# Patient Record
Sex: FEMALE | Race: WHITE | Employment: OTHER | URBAN - METROPOLITAN AREA
[De-identification: names, ages, dates, MRNs, and addresses within clinical notes are randomized per-mention and may not be internally consistent; named-entity substitution may affect disease eponyms.]

---

## 2017-02-18 PROBLEM — S43.006A SHOULDER DISLOCATION: Status: ACTIVE | Noted: 2017-02-18

## 2022-11-14 ENCOUNTER — APPOINTMENT (OUTPATIENT)
Dept: GENERAL RADIOLOGY | Age: 87
DRG: 690 | End: 2022-11-14
Payer: MEDICARE

## 2022-11-14 ENCOUNTER — APPOINTMENT (OUTPATIENT)
Dept: CT IMAGING | Age: 87
DRG: 690 | End: 2022-11-14
Payer: MEDICARE

## 2022-11-14 ENCOUNTER — HOSPITAL ENCOUNTER (INPATIENT)
Age: 87
LOS: 5 days | Discharge: HOME | DRG: 690 | End: 2022-11-21
Attending: EMERGENCY MEDICINE | Admitting: INTERNAL MEDICINE
Payer: MEDICARE

## 2022-11-14 DIAGNOSIS — N30.00 ACUTE CYSTITIS WITHOUT HEMATURIA: ICD-10-CM

## 2022-11-14 DIAGNOSIS — R41.82 ALTERED MENTAL STATUS, UNSPECIFIED ALTERED MENTAL STATUS TYPE: Primary | ICD-10-CM

## 2022-11-14 DIAGNOSIS — R41.0 ACUTE DELIRIUM: ICD-10-CM

## 2022-11-14 LAB
ALBUMIN SERPL-MCNC: 4.5 G/DL (ref 3.5–5.2)
ALP BLD-CCNC: 86 U/L (ref 35–104)
ALT SERPL-CCNC: 12 U/L (ref 0–32)
AMMONIA: <10 UMOL/L (ref 11–51)
ANION GAP SERPL CALCULATED.3IONS-SCNC: 16 MMOL/L (ref 7–16)
AST SERPL-CCNC: 34 U/L (ref 0–31)
BACTERIA: ABNORMAL /HPF
BASOPHILS ABSOLUTE: 0.04 E9/L (ref 0–0.2)
BASOPHILS RELATIVE PERCENT: 0.4 % (ref 0–2)
BILIRUB SERPL-MCNC: 0.4 MG/DL (ref 0–1.2)
BILIRUBIN URINE: NEGATIVE
BLOOD, URINE: ABNORMAL
BUN BLDV-MCNC: 14 MG/DL (ref 6–23)
CALCIUM SERPL-MCNC: 10.7 MG/DL (ref 8.6–10.2)
CHLORIDE BLD-SCNC: 98 MMOL/L (ref 98–107)
CLARITY: CLEAR
CO2: 25 MMOL/L (ref 22–29)
COLOR: YELLOW
CREAT SERPL-MCNC: 1 MG/DL (ref 0.5–1)
EOSINOPHILS ABSOLUTE: 0 E9/L (ref 0.05–0.5)
EOSINOPHILS RELATIVE PERCENT: 0 % (ref 0–6)
EPITHELIAL CELLS, UA: ABNORMAL /HPF
GFR SERPL CREATININE-BSD FRML MDRD: 52 ML/MIN/1.73
GLUCOSE BLD-MCNC: 119 MG/DL (ref 74–99)
GLUCOSE URINE: NEGATIVE MG/DL
HCT VFR BLD CALC: 45.8 % (ref 34–48)
HEMOGLOBIN: 14.8 G/DL (ref 11.5–15.5)
IMMATURE GRANULOCYTES #: 0.05 E9/L
IMMATURE GRANULOCYTES %: 0.5 % (ref 0–5)
KETONES, URINE: NEGATIVE MG/DL
LACTIC ACID, SEPSIS: 2 MMOL/L (ref 0.5–1.9)
LEUKOCYTE ESTERASE, URINE: ABNORMAL
LYMPHOCYTES ABSOLUTE: 0.66 E9/L (ref 1.5–4)
LYMPHOCYTES RELATIVE PERCENT: 7 % (ref 20–42)
MCH RBC QN AUTO: 29.7 PG (ref 26–35)
MCHC RBC AUTO-ENTMCNC: 32.3 % (ref 32–34.5)
MCV RBC AUTO: 91.8 FL (ref 80–99.9)
MONOCYTES ABSOLUTE: 0.7 E9/L (ref 0.1–0.95)
MONOCYTES RELATIVE PERCENT: 7.5 % (ref 2–12)
NEUTROPHILS ABSOLUTE: 7.92 E9/L (ref 1.8–7.3)
NEUTROPHILS RELATIVE PERCENT: 84.6 % (ref 43–80)
NITRITE, URINE: POSITIVE
PDW BLD-RTO: 14.1 FL (ref 11.5–15)
PH UA: 7 (ref 5–9)
PLATELET # BLD: 291 E9/L (ref 130–450)
PMV BLD AUTO: 10.2 FL (ref 7–12)
POTASSIUM REFLEX MAGNESIUM: 4.2 MMOL/L (ref 3.5–5)
PROTEIN UA: NEGATIVE MG/DL
RBC # BLD: 4.99 E12/L (ref 3.5–5.5)
RBC UA: ABNORMAL /HPF (ref 0–2)
SODIUM BLD-SCNC: 139 MMOL/L (ref 132–146)
SPECIFIC GRAVITY UA: 1.01 (ref 1–1.03)
TOTAL PROTEIN: 8.8 G/DL (ref 6.4–8.3)
UROBILINOGEN, URINE: 0.2 E.U./DL
WBC # BLD: 9.4 E9/L (ref 4.5–11.5)
WBC UA: >20 /HPF (ref 0–5)

## 2022-11-14 PROCEDURE — 71045 X-RAY EXAM CHEST 1 VIEW: CPT

## 2022-11-14 PROCEDURE — 99285 EMERGENCY DEPT VISIT HI MDM: CPT

## 2022-11-14 PROCEDURE — 96375 TX/PRO/DX INJ NEW DRUG ADDON: CPT

## 2022-11-14 PROCEDURE — 87077 CULTURE AEROBIC IDENTIFY: CPT

## 2022-11-14 PROCEDURE — 81001 URINALYSIS AUTO W/SCOPE: CPT

## 2022-11-14 PROCEDURE — 85025 COMPLETE CBC W/AUTO DIFF WBC: CPT

## 2022-11-14 PROCEDURE — 87186 SC STD MICRODIL/AGAR DIL: CPT

## 2022-11-14 PROCEDURE — 83605 ASSAY OF LACTIC ACID: CPT

## 2022-11-14 PROCEDURE — 2580000003 HC RX 258: Performed by: EMERGENCY MEDICINE

## 2022-11-14 PROCEDURE — 87088 URINE BACTERIA CULTURE: CPT

## 2022-11-14 PROCEDURE — 80053 COMPREHEN METABOLIC PANEL: CPT

## 2022-11-14 PROCEDURE — 82140 ASSAY OF AMMONIA: CPT

## 2022-11-14 PROCEDURE — 6360000002 HC RX W HCPCS: Performed by: EMERGENCY MEDICINE

## 2022-11-14 PROCEDURE — 70450 CT HEAD/BRAIN W/O DYE: CPT

## 2022-11-14 PROCEDURE — 93005 ELECTROCARDIOGRAM TRACING: CPT | Performed by: EMERGENCY MEDICINE

## 2022-11-14 PROCEDURE — G0378 HOSPITAL OBSERVATION PER HR: HCPCS

## 2022-11-14 PROCEDURE — 96361 HYDRATE IV INFUSION ADD-ON: CPT

## 2022-11-14 RX ORDER — 0.9 % SODIUM CHLORIDE 0.9 %
500 INTRAVENOUS SOLUTION INTRAVENOUS ONCE
Status: COMPLETED | OUTPATIENT
Start: 2022-11-14 | End: 2022-11-14

## 2022-11-14 RX ADMIN — CEFTRIAXONE 1000 MG: 1 INJECTION, POWDER, FOR SOLUTION INTRAMUSCULAR; INTRAVENOUS at 17:33

## 2022-11-14 RX ADMIN — SODIUM CHLORIDE 500 ML: 9 INJECTION, SOLUTION INTRAVENOUS at 17:32

## 2022-11-14 ASSESSMENT — PAIN - FUNCTIONAL ASSESSMENT
PAIN_FUNCTIONAL_ASSESSMENT: NONE - DENIES PAIN
PAIN_FUNCTIONAL_ASSESSMENT: NONE - DENIES PAIN

## 2022-11-14 NOTE — ED NOTES
Family called 46, states pt lives alone with hx of dementia and refusing to care for self, pt was pink slipped pta, aggressive with ems, ems state pt does not take any home meds     Rock Stahl RN  11/14/22 1738

## 2022-11-14 NOTE — ED PROVIDER NOTES
Department of Emergency Medicine   ED  Provider Note  Admit Date/RoomTime: 11/14/2022  1:10 PM  ED Room: [facility]          History of Present Illness:  11/14/22, Time: 1:30 PM EST  Chief Complaint   Patient presents with    Altered Mental Status     Per family patient has been combative and confused. States she has had hx of UTI                  Lynnette Pan is a 80 y.o. female presenting to the ED for altered mental status, beginning prior to arrival.  The complaint has been constant, severe in severity, and worsened by nothing. Patient presents for evaluation of altered mental status. The patient's daughter is here and says for the last few days her mother has been mean and combative and angry. She reports this is new. She has never acted like this before. They are worried she may have a urinary tract infection. No fevers or chills. No chest pain or shortness of breath. Patient denies complaints but says she is upset that she is here. She is confused. Family normally says she is oriented and not like this. ENCOUNTER LIMITATION:    Please note that the HPI, ROS, Past History, and Physical Examination are limited due to this patients mental status, confusion, dementia, and acuity of illness. Review of Systems:   A complete review of systems was unable to be performed secondary to the limitations noted above      --------------------------------------------- PAST HISTORY ---------------------------------------------  Past Medical History:  has a past medical history of Chronic back pain, Hyperlipidemia, Hypertension, Osteoarthritis, and Osteoporosis. Past Surgical History:  has a past surgical history that includes Cataract removal with implant (Bilateral); Endoscopy, colon, diagnostic; and Endoscopy, colon, diagnostic (09/22/2016). Social History:  reports that she has quit smoking.  She has never used smokeless tobacco. She reports that she does not drink alcohol and does not use drugs. Family History: family history is not on file. . Unless otherwise noted, family history is non contributory    The patients home medications have been reviewed. Allergies: Versed [midazolam]    I have reviewed the past medical history, past surgical history, social history, and family history    ---------------------------------------------------PHYSICAL EXAM--------------------------------------    Constitutional/General: Alert and oriented x1  Head: Normocephalic and atraumatic  Eyes: PERRL, EOMI, sclera non icteric  ENT: Oropharynx clear, handling secretions, no trismus, no asymmetry of the posterior oropharynx or uvular edema  Neck: Supple, full ROM, no stridor, no meningeal signs  Respiratory: Lungs clear to auscultation bilaterally, no wheezes, rales, or rhonchi. Not in respiratory distress  Cardiovascular:  Regular rate. Regular rhythm. No murmurs, no gallops, no rubs. 2+ distal pulses. Equal extremity pulses. Gastrointestinal:  Abdomen Soft, Non tender, Non distended. No rebound, guarding, or rigidity. No pulsatile masses. Musculoskeletal: Moves all extremities x 4. Warm and well perfused, no clubbing, no cyanosis, no edema. Capillary refill <3 seconds  Skin: skin warm and dry. No rashes. Neurologic: GCS 14, confused, no focal deficits, symmetric strength 5/5 in the upper and lower extremities bilaterally, speech clear, no unilateral weakness  Psychiatric: Normal Affect          -------------------------------------------------- RESULTS -------------------------------------------------  Results are listed below.      LABS: (Lab results interpreted by me)  Results for orders placed or performed during the hospital encounter of 11/14/22   CBC with Auto Differential   Result Value Ref Range    WBC 9.4 4.5 - 11.5 E9/L    RBC 4.99 3.50 - 5.50 E12/L    Hemoglobin 14.8 11.5 - 15.5 g/dL    Hematocrit 45.8 34.0 - 48.0 %    MCV 91.8 80.0 - 99.9 fL    MCH 29.7 26.0 - 35.0 pg    MCHC 32.3 32.0 - 34.5 % RDW 14.1 11.5 - 15.0 fL    Platelets 240 936 - 454 E9/L    MPV 10.2 7.0 - 12.0 fL    Neutrophils % 84.6 (H) 43.0 - 80.0 %    Immature Granulocytes % 0.5 0.0 - 5.0 %    Lymphocytes % 7.0 (L) 20.0 - 42.0 %    Monocytes % 7.5 2.0 - 12.0 %    Eosinophils % 0.0 0.0 - 6.0 %    Basophils % 0.4 0.0 - 2.0 %    Neutrophils Absolute 7.92 (H) 1.80 - 7.30 E9/L    Immature Granulocytes # 0.05 E9/L    Lymphocytes Absolute 0.66 (L) 1.50 - 4.00 E9/L    Monocytes Absolute 0.70 0.10 - 0.95 E9/L    Eosinophils Absolute 0.00 (L) 0.05 - 0.50 E9/L    Basophils Absolute 0.04 0.00 - 0.20 E9/L   Comprehensive Metabolic Panel w/ Reflex to MG   Result Value Ref Range    Sodium 139 132 - 146 mmol/L    Potassium reflex Magnesium 4.2 3.5 - 5.0 mmol/L    Chloride 98 98 - 107 mmol/L    CO2 25 22 - 29 mmol/L    Anion Gap 16 7 - 16 mmol/L    Glucose 119 (H) 74 - 99 mg/dL    BUN 14 6 - 23 mg/dL    Creatinine 1.0 0.5 - 1.0 mg/dL    Est, Glom Filt Rate 52 >=60 mL/min/1.73    Calcium 10.7 (H) 8.6 - 10.2 mg/dL    Total Protein 8.8 (H) 6.4 - 8.3 g/dL    Albumin 4.5 3.5 - 5.2 g/dL    Total Bilirubin 0.4 0.0 - 1.2 mg/dL    Alkaline Phosphatase 86 35 - 104 U/L    ALT 12 0 - 32 U/L    AST 34 (H) 0 - 31 U/L   Ammonia   Result Value Ref Range    Ammonia <10.0 (L) 11.0 - 51.0 umol/L   Lactate, Sepsis   Result Value Ref Range    Lactic Acid, Sepsis 2.0 (H) 0.5 - 1.9 mmol/L   Urinalysis   Result Value Ref Range    Color, UA Yellow Straw/Yellow    Clarity, UA Clear Clear    Glucose, Ur Negative Negative mg/dL    Bilirubin Urine Negative Negative    Ketones, Urine Negative Negative mg/dL    Specific Gravity, UA 1.010 1.005 - 1.030    Blood, Urine TRACE-INTACT Negative    pH, UA 7.0 5.0 - 9.0    Protein, UA Negative Negative mg/dL    Urobilinogen, Urine 0.2 <2.0 E.U./dL    Nitrite, Urine POSITIVE (A) Negative    Leukocyte Esterase, Urine MODERATE (A) Negative   Microscopic Urinalysis   Result Value Ref Range    WBC, UA >20 (A) 0 - 5 /HPF    RBC, UA 2-5 0 - 2 /HPF Epithelial Cells, UA MODERATE /HPF    Bacteria, UA MANY (A) None Seen /HPF   EKG 12 Lead   Result Value Ref Range    Ventricular Rate 90 BPM    Atrial Rate 90 BPM    P-R Interval 172 ms    QRS Duration 86 ms    Q-T Interval 380 ms    QTc Calculation (Bazett) 464 ms    P Axis 21 degrees    R Axis -4 degrees   ,       RADIOLOGY:    Radiologist interpretation  CT HEAD WO CONTRAST   Final Result   1. There is no acute intracranial abnormality. Specifically, there is no   intracranial hemorrhage. 2. Atrophy and periventricular leukomalacia,         XR CHEST PORTABLE   Final Result   Questionable left upper lobe infiltrate versus superimposed soft tissues. If   clinically warranted CT of the chest can be obtained for further evaluation. EKG Interpretation  Interpreted by emergency department physician, Dakota Villatoro MD    Date of EK22  Time: 1627    Rhythm: normal sinus   Rate: normal  Axis: left  Conduction: normal  ST Segments: no acute change  T Waves: no acute change    Clinical Impression: sinus rhythm, no acute ischemic changes  Comparison to prior EKG: no previous EKG      ------------------------- NURSING NOTES AND VITALS REVIEWED ---------------------------   The nursing notes within the ED encounter and vital signs as below have been reviewed by myself  BP (!) 163/99   Pulse 72   Temp 97.6 °F (36.4 °C) (Oral)   Resp 16   SpO2 98%     Oxygen Saturation Interpretation: Normal    The patients available past medical records and past encounters were reviewed.         ------------------------------ ED [facility]----------------------         Dr. Wanda WESTON, am the primary provider of record        Medical Decision Making:   AMS, no focal weakness to suggest stroke  ?acute delerium  No neck pain or nuchal rigidity or headache or fever to suggest CNS infection  Antibiotics for UTI  [place] to admit      Name and Route of medications administered in the ED:  Medications cefTRIAXone (ROCEPHIN) 1,000 mg in sterile water 10 mL IV syringe (1,000 mg IntraVENous Given 11/14/22 5420)   0.9 % sodium chloride bolus (0 mLs IntraVENous Stopped 11/14/22 0460)                Consultations:  Dr. Mery Suresh  Counseling: The emergency provider has spoken with the patient and daughter and discussed todays results, in addition to providing specific details for the plan of care and counseling regarding the diagnosis and prognosis. Questions are answered at this time and they are agreeable with the plan.       --------------------------------- IMPRESSION AND DISPOSITION ---------------------------------    IMPRESSION  1. Altered mental status, unspecified altered mental status type    2. Acute delirium    3. Acute cystitis without hematuria        DISPOSITION  Disposition: Admit to telemetry  Patient condition is stable        NOTE: This report was transcribed using voice recognition software.  Every effort was made to ensure accuracy; however, inadvertent computerized transcription errors may be present        Mynor Lopez MD  11/14/22 9796

## 2022-11-15 ENCOUNTER — APPOINTMENT (OUTPATIENT)
Dept: CT IMAGING | Age: 87
DRG: 690 | End: 2022-11-15
Payer: MEDICARE

## 2022-11-15 PROBLEM — R41.82 ALTERED MENTAL STATUS: Status: ACTIVE | Noted: 2022-11-15

## 2022-11-15 LAB
EKG ATRIAL RATE: 90 BPM
EKG P AXIS: 21 DEGREES
EKG P-R INTERVAL: 172 MS
EKG Q-T INTERVAL: 380 MS
EKG QRS DURATION: 86 MS
EKG QTC CALCULATION (BAZETT): 464 MS
EKG R AXIS: -4 DEGREES
EKG VENTRICULAR RATE: 90 BPM

## 2022-11-15 PROCEDURE — 2580000003 HC RX 258: Performed by: INTERNAL MEDICINE

## 2022-11-15 PROCEDURE — G0378 HOSPITAL OBSERVATION PER HR: HCPCS

## 2022-11-15 PROCEDURE — 6370000000 HC RX 637 (ALT 250 FOR IP): Performed by: INTERNAL MEDICINE

## 2022-11-15 PROCEDURE — 96365 THER/PROPH/DIAG IV INF INIT: CPT

## 2022-11-15 PROCEDURE — 96376 TX/PRO/DX INJ SAME DRUG ADON: CPT

## 2022-11-15 PROCEDURE — 2580000003 HC RX 258: Performed by: PSYCHIATRY & NEUROLOGY

## 2022-11-15 PROCEDURE — 71250 CT THORAX DX C-: CPT

## 2022-11-15 PROCEDURE — 6360000002 HC RX W HCPCS: Performed by: INTERNAL MEDICINE

## 2022-11-15 PROCEDURE — 2500000003 HC RX 250 WO HCPCS: Performed by: PSYCHIATRY & NEUROLOGY

## 2022-11-15 RX ORDER — ENOXAPARIN SODIUM 100 MG/ML
30 INJECTION SUBCUTANEOUS DAILY
Status: DISCONTINUED | OUTPATIENT
Start: 2022-11-15 | End: 2022-11-21 | Stop reason: HOSPADM

## 2022-11-15 RX ORDER — ACETAMINOPHEN 325 MG/1
650 TABLET ORAL EVERY 6 HOURS PRN
Status: DISCONTINUED | OUTPATIENT
Start: 2022-11-15 | End: 2022-11-21 | Stop reason: HOSPADM

## 2022-11-15 RX ORDER — SODIUM CHLORIDE 0.9 % (FLUSH) 0.9 %
5-40 SYRINGE (ML) INJECTION EVERY 12 HOURS SCHEDULED
Status: DISCONTINUED | OUTPATIENT
Start: 2022-11-15 | End: 2022-11-21 | Stop reason: HOSPADM

## 2022-11-15 RX ORDER — MELATONIN 200 MCG
3 TABLET ORAL DAILY
Status: DISCONTINUED | OUTPATIENT
Start: 2022-11-15 | End: 2022-11-15

## 2022-11-15 RX ORDER — GABAPENTIN 250 MG/5ML
50 SOLUTION ORAL EVERY 12 HOURS SCHEDULED
Status: DISCONTINUED | OUTPATIENT
Start: 2022-11-15 | End: 2022-11-15

## 2022-11-15 RX ORDER — SODIUM CHLORIDE 9 MG/ML
INJECTION, SOLUTION INTRAVENOUS PRN
Status: DISCONTINUED | OUTPATIENT
Start: 2022-11-15 | End: 2022-11-21 | Stop reason: HOSPADM

## 2022-11-15 RX ORDER — SODIUM CHLORIDE 0.9 % (FLUSH) 0.9 %
5-40 SYRINGE (ML) INJECTION PRN
Status: DISCONTINUED | OUTPATIENT
Start: 2022-11-15 | End: 2022-11-21 | Stop reason: HOSPADM

## 2022-11-15 RX ORDER — POLYETHYLENE GLYCOL 3350 17 G/17G
17 POWDER, FOR SOLUTION ORAL DAILY PRN
Status: DISCONTINUED | OUTPATIENT
Start: 2022-11-15 | End: 2022-11-21 | Stop reason: HOSPADM

## 2022-11-15 RX ORDER — HALOPERIDOL 5 MG/ML
2 INJECTION INTRAMUSCULAR EVERY 6 HOURS PRN
Status: DISCONTINUED | OUTPATIENT
Start: 2022-11-15 | End: 2022-11-16

## 2022-11-15 RX ORDER — ASPIRIN 81 MG/1
81 TABLET ORAL DAILY
COMMUNITY

## 2022-11-15 RX ORDER — ENOXAPARIN SODIUM 100 MG/ML
40 INJECTION SUBCUTANEOUS DAILY
Status: DISCONTINUED | OUTPATIENT
Start: 2022-11-15 | End: 2022-11-15 | Stop reason: DRUGHIGH

## 2022-11-15 RX ORDER — ONDANSETRON 2 MG/ML
4 INJECTION INTRAMUSCULAR; INTRAVENOUS EVERY 6 HOURS PRN
Status: DISCONTINUED | OUTPATIENT
Start: 2022-11-15 | End: 2022-11-21 | Stop reason: HOSPADM

## 2022-11-15 RX ORDER — PANTOPRAZOLE SODIUM 40 MG/1
40 TABLET, DELAYED RELEASE ORAL
Status: DISCONTINUED | OUTPATIENT
Start: 2022-11-15 | End: 2022-11-21 | Stop reason: HOSPADM

## 2022-11-15 RX ORDER — DIVALPROEX SODIUM 125 MG/1
125 CAPSULE, COATED PELLETS ORAL EVERY 8 HOURS
Status: DISCONTINUED | OUTPATIENT
Start: 2022-11-15 | End: 2022-11-21 | Stop reason: HOSPADM

## 2022-11-15 RX ORDER — ACETAMINOPHEN 650 MG/1
650 SUPPOSITORY RECTAL EVERY 6 HOURS PRN
Status: DISCONTINUED | OUTPATIENT
Start: 2022-11-15 | End: 2022-11-21 | Stop reason: HOSPADM

## 2022-11-15 RX ORDER — LANOLIN ALCOHOL/MO/W.PET/CERES
3 CREAM (GRAM) TOPICAL NIGHTLY
Status: DISCONTINUED | OUTPATIENT
Start: 2022-11-15 | End: 2022-11-21 | Stop reason: HOSPADM

## 2022-11-15 RX ORDER — UBIDECARENONE 100 MG
100 CAPSULE ORAL DAILY
Status: ON HOLD | COMMUNITY
End: 2022-11-21 | Stop reason: HOSPADM

## 2022-11-15 RX ORDER — ONDANSETRON 4 MG/1
4 TABLET, ORALLY DISINTEGRATING ORAL EVERY 8 HOURS PRN
Status: DISCONTINUED | OUTPATIENT
Start: 2022-11-15 | End: 2022-11-21 | Stop reason: HOSPADM

## 2022-11-15 RX ADMIN — Medication 10 ML: at 20:54

## 2022-11-15 RX ADMIN — ACETAMINOPHEN 650 MG: 325 TABLET, FILM COATED ORAL at 20:54

## 2022-11-15 RX ADMIN — SODIUM CHLORIDE, PRESERVATIVE FREE 10 ML: 5 INJECTION INTRAVENOUS at 17:10

## 2022-11-15 RX ADMIN — SODIUM CHLORIDE, PRESERVATIVE FREE 10 ML: 5 INJECTION INTRAVENOUS at 17:48

## 2022-11-15 RX ADMIN — CEFTRIAXONE 1000 MG: 1 INJECTION, POWDER, FOR SOLUTION INTRAMUSCULAR; INTRAVENOUS at 17:11

## 2022-11-15 RX ADMIN — DEXTROSE MONOHYDRATE 125 MG: 50 INJECTION, SOLUTION INTRAVENOUS at 17:46

## 2022-11-15 RX ADMIN — Medication 10 ML: at 09:15

## 2022-11-15 ASSESSMENT — LIFESTYLE VARIABLES
HOW MANY STANDARD DRINKS CONTAINING ALCOHOL DO YOU HAVE ON A TYPICAL DAY: PATIENT DOES NOT DRINK
HOW OFTEN DO YOU HAVE A DRINK CONTAINING ALCOHOL: NEVER

## 2022-11-15 ASSESSMENT — PAIN - FUNCTIONAL ASSESSMENT: PAIN_FUNCTIONAL_ASSESSMENT: NONE - DENIES PAIN

## 2022-11-15 NOTE — PROGRESS NOTES
Called and left message for Dr. Marilyn Armijo regarding if pt can be downgraded to medsurg. Awaiting yajaira back.

## 2022-11-15 NOTE — CARE COORDINATION
Return call received from the patient's daughter Wisam Garcia to discuss transition of care. Patient lives with her other daughter Ramiro and granddaughter Shilpa Lester in a two story home with bed and bath on the second floor, with 15 steps to the second floor and bilateral hand rails. Patient has a BSC, Emani Friends, hand rails in the shower, and a bath lift chair. Per patient's daughter the patient was independent with most ADL's prior to admission. Family cooks and cleans for the patient. Patient has a history of home care through [facility] and a remote history of outpatient therapy. Patient's PCP is Dr Maria Isabel Watson and they use [facility] on [address] [facility] for her medications. Per Wisam Garcia, the plan is to take the patient home, possibly with home health care, she would like to see how the patient does with therapy prior to making a decision. She would like a list of private duty providers left at the bedside as the patient may need additional care for when the family is working. Also discussed a referral to [facility] for possible in home assistance for the patient. The patient's daughter would like this as well. Referral form completed and faxed to [facility] at [phone]. PT/OT to see the patient for transition of care planning. Nam Frazier RN.  [place]:  [phone]      Return call received from the patient's daughter Wisam Garcia re: transition of care. He had additional questions re: DME for a Wheelchair, a bedalarm, and transition to Assisted Living. Discussed getting a wheelchair and explained that we will obtain an order for JIGNA Brown for transition of care. Reviewed [facility] and explained that these facilities are private pay and that the patient's insurance will not cover them for transition of care. Wisam Garcia expressed understanding. She stated she would still like a list to review and to call around for pricing for future planning.   After some research by CHRISTINE, explained that Bed/Chair alarms are not covered under a patient's insurance and would be a private pay items. Also reviewed options of where you can get these items. Maria A Moses expressed understanding. Assisted living facility list also placed at the bedside for the patient's family. DME for a Wheel chair received and called to Dayne, liaison with DME and detailed VM left with referral information. Will continue to follow.      Belen Mi RN.  Ana Grey  [phone]

## 2022-11-15 NOTE — CARE COORDINATION
Chart reviewed and case reviewed in IDR with nursing. Patient on IV Rocephin q24 hrs for UTI. Patient agitated and has a sitter at the bedside at this time. Patient confused alert to person only and not able to participate in meaningful transition of care conversation. Call placed to the patient's daughter Osmany Slaughter for transition of care planning, [phone], and detailed VM left with number for return call. Call also placed to the home phone number for Osmany Slaughter and VM left as well. Orders for PT/OT and speech therapy received for transition of care planning. Await return call from patient's daughter. Will continue to follow.      Benny Church RN.  Delroy Martino  [phone]

## 2022-11-15 NOTE — PROGRESS NOTES
Physical Therapy    Date: 11/15/2022       Patient Name: Deiter Colunga  : 1929      MRN: [id number]    Physical therapy order received and chart reviewed. PT evaluation attempted, but nursing staff requested hold as patient is resting. Will attempt in PM.   Will follow up as appropriate.      Chivo Mclaughlin PT, DPT  [id number]

## 2022-11-15 NOTE — CONSULTS
Reason for consult: agitated psychosis       Consulting Physician:Dr Peterson       HPI  Patient presented to the ED with hx of dementia, HTN, hyperlipidemia, hx of pancreatic mass, and osteoarthritis with AMS and change in behaviors presenting from home after the daughter called EMS. In ED the patient's daughter reported that the patient has been combative and angry, which is a new presentation for the patient. There was concern for UTI, which she was admitted for further evaluation and work up. She is being treated empirically with antibiotics. Has been combative with staff. I went to see the patient this afternoon with NP Lakesha Cadena on entering the patients room she is side ways in bed, combative with staff, she kicked her  in the stomach. She is confused, agitated and combative. She is not able to participate in any formal assessment. When asking her name she shouts \"shut up! \" Staff report that she is alert, oriented to self. MSE  Unable to assess full MSE. Patient not cooperative for any assessment, combative with staff       DX  Delirium (acute)      Plan/Recommendations: The patient case, plan of care and recommendations has been discussed with Dr Kevin Louie and the collaborative psychiatric care team.         Agitation is abrupt change in behavior and deviation from this patient's baseline, likely 2/2 delirium from medical condition. Delirium may take several days- weeks to fully resolve. We recommend the following for reduction of patient confusion and irritability     Depakote sprinkles 125 mg 3 times daily x 7 days   Melatonin 3 mg daily at 1800 x 7 days     Psychiatry is aware of patient's history of pancreatic mass however patient does not have any history of hemorrhagic pancreatitis. We will cautiously start Depakote sprinkles 125 mg twice daily for 7 days to help clear delirium. Thank you for the opportunity to participate in this patients care.   Psychiatry signs off

## 2022-11-15 NOTE — PROGRESS NOTES
This patient is on medication that requires renal, weight, and/or indication dose adjustment. Date Body Weight IBW  Adjusted BW SCr  CrCl Dialysis status   11/15/2022 104 lb (47.2 kg) Ideal body weight: 45.5 kg (100 lb 5.7 oz)  Adjusted ideal body weight: 46.2 kg (101 lb 13 oz) Serum creatinine: 1 mg/dL 11/14/22 1459  Estimated creatinine clearance: 25 mL/min N/a       Pharmacy has dose-adjusted the following medication(s):    Date Previous Order Adjusted Order   11/15/2022 Lovenox 40mg daily Lovenox 30mg daily       These changes were made per protocol according to the [address] for Pharmacists. *Please note this dose may need readjusted if patient's condition changes. Please contact pharmacy with any questions regarding these changes.     Charon Meigs, [facility]  11/15/2022  4:10 AM

## 2022-11-15 NOTE — H&P
[address]                  [address] [facility],  [facility]                              HISTORY AND PHYSICAL    PATIENT NAME: Brooke Santos                      :        1929  MED REC NO:   [id number]                            ROOM:       [id number]  ACCOUNT NO:   [de-identified]                           ADMIT DATE: 2022  PROVIDER:     Chance Maya DO    ADMISSION HISTORY AND PHYSICAL    CHIEF COMPLAINT:  Altered mental status. HISTORY OF PRESENT ILLNESS:  The patient is a 77-year-old   female who presents from home with altered mental status times several  days. Diagnostic evaluation in the emergency room revealed a possible  UTI. She was admitted for further evaluation and treatment. PAST MEDICAL HISTORY:  Hypertension, hyperlipidemia, osteoarthritis. PAST SURGICAL HISTORY:  Bilateral eye cataract surgery, left total knee  arthroplasty. REVIEW OF SYSTEMS:  Remarkable for the above-stated chief complaint. ALLERGIES:  To PENICILLIN and VERSED. MEDICATIONS PRIOR TO ADMISSION:  CoQ10, aspirin, Tylenol, Protonix. PRIMARY CARE PHYSICIAN:  Nakia Medina MD    SOCIAL HISTORY:  No tobacco or alcohol. PHYSICAL EXAMINATION:  GENERAL APPEARANCE:  Physical exam reveals a 77-year-old   female who is responsive to verbal stimuli, uncooperative. VITAL SIGNS:  On admission, temperature 97.6, pulse 76, respirations 17,  blood pressure 163/99. HEAD:  Normocephalic, atraumatic. EYES:  Pupils are round and reactive to light. Fundi are not well  visualized. NOSE:  No obstruction, polyp, or discharge noted. MOUTH:  Mucosa without lesion. PHARYNX:  Noninjected without exudate. NECK:  Supple. No JVD. No thyromegaly. No carotid bruits. HEART:  Regular rate and rhythm without murmur. LUNGS:  Clear to auscultation bilaterally. ABDOMEN:  Positive bowel sounds, soft, nondistended.   BACK:  With increased thoracic kyphosis. EXTREMITIES:  Without edema. LYMPH NODES:  No adenopathy noted. SKIN:  Without rash or lesion. IMPRESSION:  Acute delirium, UTI. PLAN:  Admit. Empiric antibiotics. PT, OT eval.   for  discharge planning. Discharge plan, home versus ECF as per the  patient's progress.         Mehran Mcgee DO    D: 11/15/2022 8:40:44       T: 11/15/2022 8:43:01     [id number]  Job#: [id number]     Doc#: [id number]    CC:

## 2022-11-16 LAB
ORGANISM: ABNORMAL
TSH SERPL DL<=0.05 MIU/L-ACNC: 2.91 UIU/ML (ref 0.27–4.2)
URINE CULTURE, ROUTINE: ABNORMAL
VITAMIN B-12: 433 PG/ML (ref 211–946)

## 2022-11-16 PROCEDURE — 6370000000 HC RX 637 (ALT 250 FOR IP): Performed by: NURSE PRACTITIONER

## 2022-11-16 PROCEDURE — 84443 ASSAY THYROID STIM HORMONE: CPT

## 2022-11-16 PROCEDURE — 96372 THER/PROPH/DIAG INJ SC/IM: CPT

## 2022-11-16 PROCEDURE — 97530 THERAPEUTIC ACTIVITIES: CPT

## 2022-11-16 PROCEDURE — 97535 SELF CARE MNGMENT TRAINING: CPT

## 2022-11-16 PROCEDURE — 6370000000 HC RX 637 (ALT 250 FOR IP): Performed by: PSYCHIATRY & NEUROLOGY

## 2022-11-16 PROCEDURE — 97165 OT EVAL LOW COMPLEX 30 MIN: CPT

## 2022-11-16 PROCEDURE — 2500000003 HC RX 250 WO HCPCS: Performed by: PSYCHIATRY & NEUROLOGY

## 2022-11-16 PROCEDURE — 6360000002 HC RX W HCPCS: Performed by: INTERNAL MEDICINE

## 2022-11-16 PROCEDURE — 97161 PT EVAL LOW COMPLEX 20 MIN: CPT

## 2022-11-16 PROCEDURE — 6370000000 HC RX 637 (ALT 250 FOR IP): Performed by: INTERNAL MEDICINE

## 2022-11-16 PROCEDURE — 2580000003 HC RX 258: Performed by: INTERNAL MEDICINE

## 2022-11-16 PROCEDURE — 36415 COLL VENOUS BLD VENIPUNCTURE: CPT

## 2022-11-16 PROCEDURE — 82607 VITAMIN B-12: CPT

## 2022-11-16 PROCEDURE — 2060000000 HC ICU INTERMEDIATE R&B

## 2022-11-16 PROCEDURE — 2580000003 HC RX 258: Performed by: PSYCHIATRY & NEUROLOGY

## 2022-11-16 PROCEDURE — 96366 THER/PROPH/DIAG IV INF ADDON: CPT

## 2022-11-16 RX ORDER — HALOPERIDOL 5 MG/ML
2 INJECTION INTRAMUSCULAR EVERY 6 HOURS PRN
Status: DISCONTINUED | OUTPATIENT
Start: 2022-11-16 | End: 2022-11-21 | Stop reason: HOSPADM

## 2022-11-16 RX ADMIN — HALOPERIDOL LACTATE 2 MG: 5 INJECTION, SOLUTION INTRAMUSCULAR at 06:42

## 2022-11-16 RX ADMIN — ACETAMINOPHEN 650 MG: 325 TABLET, FILM COATED ORAL at 21:07

## 2022-11-16 RX ADMIN — MELATONIN 3 MG ORAL TABLET 3 MG: 3 TABLET ORAL at 21:05

## 2022-11-16 RX ADMIN — DEXTROSE MONOHYDRATE 125 MG: 50 INJECTION, SOLUTION INTRAVENOUS at 07:50

## 2022-11-16 RX ADMIN — Medication 10 ML: at 21:05

## 2022-11-16 RX ADMIN — CEFTRIAXONE 1000 MG: 1 INJECTION, POWDER, FOR SOLUTION INTRAMUSCULAR; INTRAVENOUS at 16:22

## 2022-11-16 RX ADMIN — Medication 10 ML: at 08:58

## 2022-11-16 RX ADMIN — DIVALPROEX SODIUM 125 MG: 125 CAPSULE ORAL at 17:28

## 2022-11-16 RX ADMIN — ACETAMINOPHEN 650 MG: 325 TABLET, FILM COATED ORAL at 16:20

## 2022-11-16 RX ADMIN — ENOXAPARIN SODIUM 30 MG: 100 INJECTION SUBCUTANEOUS at 08:58

## 2022-11-16 RX ADMIN — ACETAMINOPHEN 650 MG: 325 TABLET, FILM COATED ORAL at 03:23

## 2022-11-16 RX ADMIN — DEXTROSE MONOHYDRATE 125 MG: 50 INJECTION, SOLUTION INTRAVENOUS at 03:27

## 2022-11-16 ASSESSMENT — PAIN DESCRIPTION - LOCATION: LOCATION: RIB CAGE

## 2022-11-16 ASSESSMENT — PAIN SCALES - GENERAL
PAINLEVEL_OUTOF10: 5
PAINLEVEL_OUTOF10: 0
PAINLEVEL_OUTOF10: 7

## 2022-11-16 ASSESSMENT — PAIN - FUNCTIONAL ASSESSMENT: PAIN_FUNCTIONAL_ASSESSMENT: ACTIVITIES ARE NOT PREVENTED

## 2022-11-16 ASSESSMENT — PAIN DESCRIPTION - DESCRIPTORS: DESCRIPTORS: DISCOMFORT

## 2022-11-16 ASSESSMENT — PAIN DESCRIPTION - ORIENTATION: ORIENTATION: RIGHT

## 2022-11-16 ASSESSMENT — PAIN SCALES - WONG BAKER: WONGBAKER_NUMERICALRESPONSE: 2

## 2022-11-16 NOTE — CARE COORDINATION
Chart reviewed and case reviewed in IDR. DME for [address] ordered yesterday and called to Emilio Maxwell, liaison with [facility]. Discussed home care with patient's daughter Conor Coe yesterday and sis agreed the patient would likely need it on discharge to home. Per Joel Score, they had used [facility] in the past and that is who she would like to use again. Call placed to [facility], liaison with [facility] and referral made. Next start of care at this time is Saturday. Home care orders received for SN, PT and OT at discharge. Patient is more cooperative this morning per nursing. Depakote sprinkles and Melatonin started per psych x7 days. [place] slipped discontinued. IV Rocephin continues at this time for UTI. Will continue to follow for further transition of care planning needs.      Richard Reyes RN.  [facility]  [phone]

## 2022-11-16 NOTE — PROGRESS NOTES
Physical Therapy  Physical Therapy Initial Assessment     Name: Anabela Maya  : 1929  MRN: [id number]      Date of Service: 2022    Evaluating PT:  Andrea Rincon PT, DPT [id number]    Room #:  6271/1117-Y  Diagnosis:  Acute delirium [R41.0]  Acute cystitis without hematuria [N30.00]  Altered mental status, unspecified altered mental status type [R41.82]  PMHx/PSHx:    Past Medical History:   Diagnosis Date    Chronic back pain     Hyperlipidemia     Hypertension     Osteoarthritis     knee    Osteoporosis      Precautions:  Fall risk, Alarms, Sitter, 4 point restraints, Hx R displaced and nondisplaced rib fxs  Equipment Needs:  TBD    SUBJECTIVE:  Poor historian due to dementia; will obtain home set up from . OBJECTIVE:   Initial Evaluation  Date: 22 Treatment Short Term/ Long Term   Goals   AM-PAC 6 Clicks      Was pt agreeable to Eval/treatment? Yes     Does pt have pain? Yes unable to locate or quantify     Bed Mobility  Rolling: NT  Supine to sit: Max A  Sit to supine: Max A  Scooting: Max A  Supervision   Transfers Sit to stand: Max A  Stand to sit: Max A  Stand pivot: NT  SBA with AAD   Ambulation    NT  >50 feet with AAD SBA   Stair negotiation: ascended and descended  NT  2 steps with unilateral rail SBA   ROM Unable to assess due to guarding by patient     Strength Unable to assess due to guarding by patient  4+/5 UE and LE   Balance Sitting EOB:  SBA  Dynamic Standing:  NT  Sitting EOB:  Supervision  Dynamic Standing:  SBA with AAD     Pt is A & O x 1 self; agitated   Sensation:  Pt denies numbness and tingling to extremities  Edema:  unremarkable  Vitals:  WNLs    Patient education  Pt educated on role and benefits of physical therapy, safety and technique for mobility    Patient response to education:   Pt verbalized understanding Pt demonstrated skill Pt requires further education in this area   x x x     ASSESSMENT:    Conditions Requiring Skilled Therapeutic Intervention:    [x]Decreased strength     []Decreased ROM  [x]Decreased functional mobility  [x]Decreased balance   [x]Decreased endurance   []Decreased posture  []Decreased sensation  []Decreased coordination   []Decreased vision  [x]Decreased safety awareness   [x]Increased pain       Comments:  Patient deemed medically stable prior to therapy evaluation. Patient was semi supine with sitter present upon therapy arrival and provided consent to evaluation. Patient had very minimal verbalize communication throughout, mostly shaking head yes or no to questions. Pain limited all activity, but unable to locate or quantify pain with several attempts. Maximal assist to clear bed for stance to optimally adjust betty pads and kavon pants as patient was undressed below the waist; requested pants. Patient returned to bed and placed in chair position with all needs met and call light in reach for safety. Sitter present, RN notified of pain and level of participation. Treatment:  Patient practiced and was instructed in the following treatment:    Bed mobility training - pt given verbal and tactile cues to facilitate proper sequencing and safety during rolling and supine>sit as well as provided with physical assistance to complete task   Sitting EOB for >10 minutes for upright tolerance, postural awareness and BLE ROM, dressing  Transfer training - pt was given verbal and tactile cues to facilitate proper hand placement, technique and safety during sit to stand and stand to sit as well as provided with physical assistance. Skilled positioning - Pt placed in the chair position with pillows utilized to facilitate upright posture, joint and skin integrity, and interaction with environment. Pillow and education for splinting with assumed rib pain. Pt's/ family goals   1. Return to PLOF    Prognosis is fair+ for reaching above PT goals.     Patient and or family understand(s) diagnosis, prognosis, and plan of care.  Yes    PHYSICAL THERAPY PLAN OF CARE:    PT POC is established based on physician order and patient diagnosis     Referring provider/PT Order:  Dr. Melissa Guillermo One Time PT Eval and treat 11/15/22  Diagnosis:  Acute delirium [R41.0]  Acute cystitis without hematuria [N30.00]  Altered mental status, unspecified altered mental status type [R41.82]  Specific instructions for next treatment:  progress transfers and ambulation    Current Treatment Recommendations:     [x] Strengthening to improve independence with functional mobility   [] ROM to improve independence with functional mobility   [x] Balance Training to improve static/dynamic balance and to reduce fall risk  [x] Endurance Training to improve activity tolerance during functional mobility   [x] Transfer Training to improve safety and independence with all functional transfers   [x] Gait Training to improve gait mechanics, endurance and assess need for appropriate assistive device  [] Stair Training in preparation for safe discharge home and/or into the community   [] Positioning to prevent skin breakdown and contractures  [x] Safety and Education Training   [x] Patient/Caregiver Education   [] HEP  [] Other     PT long term treatment goals are located in above grid    Frequency of treatments: 2-5x/week x 1-2 weeks. Time in  1505  Time out  1530    Total Treatment Time  10 minutes     Evaluation Time includes thorough review of current medical information, gathering information on past medical history/social history and prior level of function, completion of standardized testing/informal observation of tasks, assessment of data and education on plan of care and goals.     CPT codes:  [x] Low Complexity PT evaluation 77938  [] Moderate Complexity PT evaluation 88446  [] High Complexity PT evaluation 15046  [] PT Re-evaluation 92865  [] Gait training 12239 - minutes  [] Manual therapy 06443 - minutes  [x] Therapeutic activities 17211 10 minutes  [] Therapeutic exercises 57259 - minutes  [] Neuromuscular reeducation 16265 - minutes     Kiara Session, PT, DPT [id number]

## 2022-11-16 NOTE — PROGRESS NOTES
[address]        [id number]                                                  Patient Name: Samir De Oliveira    MRN: [id number]    : 1929    Room: [address] [place] [address]      Evaluating OT: Jaquan Pruett, [address] OTR/L; [id number]      Referring Provider: Tricia Franco DO    Specific Provider Orders/Date: OT Eval and Treat 11/15/22      Diagnosis: Acute delirium  Altered mental status    Surgery: none this admission     Pertinent Medical History:  has a past medical history of Chronic back pain, Hyperlipidemia, Hypertension, Osteoarthritis, and Osteoporosis.       Reason for Admission: per daughter pt has been combative and confused, history of UTI, daughter worries she may currently have a UTI    Recommended Adaptive Equipment:  TBD pending progression     Precautions:  Fall Risk, Sitter, Cognition/Dementia, R Anterior Rib Fx 3-4 mildly displaced, R Anterior Rib Fx 5th nondisplaced     Assessment of current deficits:    [x] Functional mobility  [x]ADLs  [x] Strength               [x]Cognition    [x] Functional transfers   [x] IADLs         [x] Safety Awareness   [x]Endurance    [x] Fine Coordination              [x] Balance      [] Vision/perception   []Sensation     []Gross Motor Coordination  [] ROM  [] Delirium                   [] Motor Control     OT PLAN OF CARE   OT POC based on physician orders, patient diagnosis and results of clinical assessment    Frequency/Duration: 1-3 days/wk for 2 weeks PRN   Specific OT Treatment Interventions to include:   * Instruction/training on adapted ADL techniques and AE recommendations to increase functional independence within precautions       * Training on energy conservation strategies, correct breathing pattern and techniques to improve independence/tolerance for self-care routine  * Functional transfer/mobility training/DME recommendations for increased independence, safety, and fall prevention  * Patient/Family education to increase follow through with safety techniques and functional independence  * Recommendation of environmental modifications for increased safety with functional transfers/mobility and ADLs  * Therapeutic exercise to improve motor endurance, ROM, and functional strength for ADLs/functional transfers  * Therapeutic activities to facilitate/challenge dynamic balance, stand tolerance for increased safety and independence with ADLs    Home Living: Pt unreliable historian d/t cognition/dementia at baseline. Bathroom setup: ?   Equipment owned: unknown    Prior Level of Function: ADLs /IADLs ? Ambulated with ? Pain Level: severe pain R ribs - pt bracing R ribs unable to state location just repeatedly stating \"it hurts\"  Cognition: A&O: 1/4 - responding to name however pt limited responses to orientation and subjective questions  Follows single 1 step directions - requires increased time and repeated verbal cues during session   Memory:  poor   Sequencing:  poor   Problem solving:  poor   Judgement/safety:  poor     Functional Assessment:  AM-PAC Daily Activity Raw Score: 11/24   Initial Eval Status  Date: 11/16/22 Treatment Status  Date: STGs = LTGs  Time frame: 10-14 days   Feeding Minimal Assist   Independent    Grooming Minimal Assist   Independent    UB Dressing Moderate Assist   Stand by Assist    LB Dressing Dependent   Bakari pants seated EOB   Managing over hips  Minimal Assist    Bathing Dependent  Minimal Assist    Toileting Dependent   Minimal Assist    Bed Mobility  Log Roll: NT  Supine to sit: Max  A x 2   Sit to supine: Max  A x 2   Supine to sit: Min A   Sit to supine: Min A    Functional Transfers Sit to stand: Max  A x 2 with HHA   Stand to sit: Max  A x 2 with HHA  Stand pivot: NT  Commode: NT  Sit to stand:Min A    Stand to sit:Min A   Stand pivot: Min A   Commode: Min A     Functional Mobility NT  Min A    Balance Sitting:     Static - CGA <> Min A     Dynamic - Min A  Standing: Max  A x 2 with HHA  Sitting:  Static: SBA  Dynamic: SBA  Standing: Min A   Activity Tolerance FAIR -   Severe R rib pain with all movement required increased time and rest breaks   Pt stated \"It hurts\" and requesting \"give me something\" RN notified   Sitter present and aware  GOOD   Visual/  Perceptual Glasses: none present        Vitals WFL         BUE  ROM/Strength/  Fine motor Coordination Hand dominance: Right     RUE: limited d/t multiple rib fx     Strength: 3-/5      Strength: POOR     Coordination:  POOR     LUE: difficult to assess d/t limited command following limited AROM noted     Strength: 3-/5      Strength: FAIR     Coordination: FAIR  increase BUE muscle strength for improved indep with functional transfers       Hearing: WFL   Sensation:  No c/o numbness or tingling   Tone: WFL   Edema: unremarkable    Patient/Family Goal: to get better   Based on patient's functional performance as stated below and level of assistance needed prior to admission, this therapist believes that the patient would benefit from further skilled OT following hospital stay in an effort to increase safety, functional independence, and quality of life. Comment: Cleared by RN to see pt. Upon arrival patient lying semi supine in bed and agreeable to OT session. At end of session, patient lying semi supine in bed with call light and phone within reach, all lines and tubes intact. Overall patient demonstrated decreased independence and safety during completion of ADL/functional transfer/mobility tasks. Pt would benefit from continued skilled OT to increase safety and independence with completion of ADL/IADL tasks for functional independence and quality of life. Treatment: Required re-orientation to year/month/place.  Pt required vc's for proper technique/safety with hand placement/body mechanics/posture for bed mobility/ADLs/functional tranfers/mobility/ww management. Pt required vc's for sequencing/initiation of ADLs/functional transfers. Pt able to  sit EOB ~8 mins to increase core strength/balance/activity tolerance for ease with ADLs. Pt completed supine to sit with increased pain upon sitting EOB, pt stated \"i'm cold\" requesting pants to be donned EOB. Assist provided to thread BLE and managing over hips upon standing EOB. Pt required increased time to complete ADLs/functional transfers due to severe R rib pain. Pt completed 2 sit to stands EOB declined further functional mobility d/t pain requested to return to supine. Pt doubling over in pain in bed requesting \"get me something\" RN notified. Pt required skilled monitoring of SpO2 during session and education on energy conservation techniques. Pt required rest breaks during session and educated on pursed lip breathing. Pt appeared to have tolerated session well and appears motivated/cooperative/pleasant . Pt instructed on use of call light for assistance and fall prevention. Pt demo'ing fair understanding of education provided. Continue to educate. Rehab Potential: Good  for established goals       LTG: maximize independence with ADLs to return to PLOF    Patient and/or family were instructed on functional diagnosis, prognosis/goals and OT plan of care. Demonstrated Eliceo Lather - understanding. [] Malnutrition indicators have been identified and nursing has been notified to ensure a dietitian consult is ordered. Eval Complexity: Low     Evaluation time includes thorough review of current medical information, gathering information on past medical & social history & PLOF, completion of standardized testing, informal observation of tasks, consultation with other medical professions/disciplines, assessment of data & development of POC/goals.      Time In: 1500  Time Out: 1530  Total Treatment Time: 15    Min Units   OT Eval Low 53045  X     OT Eval Medium 62010      OT Eval High 0496 97 06 31 OT Re-Eval [id number]       Therapeutic Ex [id number]       Therapeutic Activities 91055       ADL/Self Care 71106  15  1   Orthotic Management 38144       Manual [id number]     Neuro Re-Ed 71374       Non-Billable Time          Evaluation Time additionally includes thorough review of current medical information, gathering information on past medical history/social history and prior level of function, interpretation of standardized testing/informal observation of tasks, assessment of data and development of plan of care and goals.     EFRAÍN Nichole OTR/L; [id number]

## 2022-11-16 NOTE — CONSULTS
[facility]  Neurology Consult    Date:  11/15/2022  Patient Name:  Madie Parrish  YOB: 1929  MRN: [id number]     PCP:  Enrike Soares MD   Referring:  No ref. provider found      Chief Complaint: altered mental status    History obtained from: patient's family, chart    Assessment  Madie Parrish is a 80 y.o. female admitted with acute change in behavior with agitation in the setting of UTI on top of reported preexisting cognitive decline, likely representing some degree of baseline dementia. Her current presentation is consistent with an agitated delirium. She does appear to be recurrently pointing at the right side of her chest, which is tender on palpation of the upper ribs along the anterior and posterior chest wall. Given her fall pain from an occult rib fracture could also be considered as a contributor to delirium. Plan  It is unlikely that she would be able to hold still for MRI brain, and this would be more for characterizing any baseline neurodegenerative processes  TSH, B12   TID ordered - hx of pancreatic mass noted  Agree with recommendations from psychiatry as well  CT chest w/o contrast  Will follow        History of Present Illness: Madie Parrish is a 80 y.o. right handed female presenting for evaluation of altered mental status. The patient reportedly has some mild memory issues at baseline, but for the past several days has become increasingly aggressive at home. She was brought to the ED for evaluation and during the course of this was found to have a UTI.            Review of Systems:  Unable to obtain due to altered mental status    Medical History:   Past Medical History:   Diagnosis Date    Chronic back pain     Hyperlipidemia     Hypertension     Osteoarthritis     knee    Osteoporosis         Surgical History:   Past Surgical History:   Procedure Laterality Date    CATARACT REMOVAL WITH IMPLANT Bilateral     ENDOSCOPY, COLON, DIAGNOSTIC ENDOSCOPY, COLON, DIAGNOSTIC  09/22/2016    anemia,gi bleed        Family History:   Unable to obtain due to altered mental status    Social History:  Social History     Tobacco Use    Smoking status: Former    Smokeless tobacco: Never    Tobacco comments:     quit smoking 1990s   Substance Use Topics    Alcohol use: No    Drug use: No        Current Medications:      Current Facility-Administered Medications   Medication Dose Route Frequency Provider Last Rate Last Admin    pantoprazole (PROTONIX) tablet 40 mg  40 mg Oral QAM AC Saeed Peterson, DO        sodium chloride flush 0.9 % injection 5-40 mL  5-40 mL IntraVENous 2 times per day Zamzam Peterson, DO   10 mL at 11/15/22 2054    sodium chloride flush 0.9 % injection 5-40 mL  5-40 mL IntraVENous PRN Zamzam Peterson, DO   10 mL at 11/15/22 1748    0.9 % sodium chloride infusion   IntraVENous PRN Zamzam Peterson, DO        ondansetron (ZOFRAN-ODT) disintegrating tablet 4 mg  4 mg Oral Q8H PRN Zamzam Peterson DO        Or    [facility]ansetron [facility]) injection 4 mg  4 mg IntraVENous Q6H PRN Zamzam Peterson, DO        polyethylene glycol (GLYCOLAX) packet 17 g  17 g Oral Daily PRN Zamzam Peterson, DO        acetaminophen (TYLENOL) tablet 650 mg  650 mg Oral Q6H PRN Zamzam Peterson, DO   650 mg at 11/15/22 2054    Or    acetaminophen (TYLENOL) suppository 650 mg  650 mg Rectal Q6H PRN Zamzam Peterson, DO        cefTRIAXone (ROCEPHIN) 1,000 mg in sterile water 10 mL IV syringe  1,000 mg IntraVENous Q24H Drucella Debbi, DO   1,000 mg at 11/15/22 1711    enoxaparin Sodium (LOVENOX) injection 30 mg  30 mg SubCUTAneous Daily Drucella Hotsavi, DO        haloperidol lactate (HALDOL) injection 2 mg  2 mg IntraMUSCular Q6H PRN Zamzam Peterson, DO        melatonin tablet 3 mg  3 mg Oral Nightly Janelle Curly, APRN - CNP        divalproex (DEPAKOTE SPRINKLE) capsule 125 mg  125 mg Oral Q8H Saeed Antonio DO        Or    valproate (DEPACON) 125 mg in dextrose 5 % 100 mL IVPB  125 mg IntraVENous Q8H Octavia Moe,  mL/hr at 11/15/22 1746 125 mg at 11/15/22 1746     Facility-Administered Medications Ordered in Other Encounters   Medication Dose Route Frequency Provider Last Rate Last Admin    ondansetron (ZOFRAN) injection 4 mg  4 mg IntraVENous Once John D Weilbacker, DO        0.9 % sodium chloride bolus  1,000 mL IntraVENous Once Allyn Caba, DO            Allergies: Allergies   Allergen Reactions    Penicillins Other (See Comments)     Daughter states allergy runs in the family and is fatal; doesn't want mom to have it. Pt tolerates ceftriaxone. Versed [Midazolam] Other (See Comments)     Urinary retention (per patient's daughter)        Physical Examination  Vitals   Vitals:    11/14/22 1802 11/15/22 0000 11/15/22 0340 11/15/22 0705   BP:  (!) 180/103 (!) 142/58 (!) 146/60   Pulse: 72 93 84 88   Resp: 16 14 16 14   Temp:   97.2 °F (36.2 °C) 97.6 °F (36.4 °C)   TempSrc:   Temporal Temporal   SpO2: 98% 96% 96% 96%   Weight:   104 lb (47.2 kg)    Height:   4' 9\" (1.448 m)         General: Patient appears in mild distress  HEENT: Normocephalic, atraumatic  Chest: no respiratory distress noted  Heart: regular rate  Extremities/Peripheral vascular: bruising to dorsum of left foot noted    Neurologic Examination    Mental Status  Somnolent, opens eyes to voice. Follows simple 1 step instructions. Oriented to self only. Speech is fluent. No definite receptive aphasia noted. Cranial Nerves  II. Visual fields full to threat bilaterally. Fundoscopic exam: Discs not able to be visualized  III, IV, VI: Pupils equally round and reactive to light, 3 to 2 mm bilaterally. EOMs: full, no nystagmus. V.   VII: Facial movements symmetric   VIII: Hearing intact to voice  IX,X: Palate elevates symmetrically.  No significant dysarthria  XI: Shoulders appear symmetric  XII: Tongue is midline    Motor  Patients moves bilateral upper extremities antigravity with 4 to 4-/5 strength. Will weakly withdraw bilateral lower extremities to light touch. Sensation  As above    Reflexes     Right Left   Biceps 1 1   Brachioradialis 1 1   Patellar 0 0   Achilles 0 NT   ankle clonus none none   Babinski absent absent     Coordination  No resting tremors observed    Gait    Deferred for safety/fall consideration      Labs  Recent Labs     11/14/22  1459      K 4.2   CL 98   CO2 25   BUN 14   CREATININE 1.0   GLUCOSE 119*   CALCIUM 10.7*   PROT 8.8*   LABALBU 4.5   BILITOT 0.4   ALKPHOS 86   AST 34*   ALT 12   WBC 9.4   RBC 4.99   HGB 14.8   HCT 45.8   MCV 91.8   MCH 29.7   MCHC 32.3   RDW 14.1      MPV 10.2         Imaging  CT HEAD WO CONTRAST   Final Result   1. There is no acute intracranial abnormality. Specifically, there is no   intracranial hemorrhage. 2. Atrophy and periventricular leukomalacia,         XR CHEST PORTABLE   Final Result   Questionable left upper lobe infiltrate versus superimposed soft tissues. If   clinically warranted CT of the chest can be obtained for further evaluation.          CT CHEST WO CONTRAST    (Results Pending)             Electronically signed by Rebecca Kelley DO on 11/15/2022 at 9:03 PM

## 2022-11-16 NOTE — PROGRESS NOTES
Hospital Medicine    Subjective:  pt seen this am more alert cooperative today      Current Facility-Administered Medications:     pantoprazole (PROTONIX) tablet 40 mg, 40 mg, Oral, QAM AC, Veronica Kohler Malmer, DO    sodium chloride flush 0.9 % injection 5-40 mL, 5-40 mL, IntraVENous, 2 times per day, Tariq Bearden, DO, 10 mL at 11/16/22 0858    sodium chloride flush 0.9 % injection 5-40 mL, 5-40 mL, IntraVENous, PRN, Veronica Covarrubiasmer, DO, 10 mL at 11/15/22 1748    0.9 % sodium chloride infusion, , IntraVENous, PRN, Veronica Covarrubiasmer, DO    ondansetron (ZOFRAN-ODT) disintegrating tablet 4 mg, 4 mg, Oral, Q8H PRN **OR** ondansetron (ZOFRAN) injection 4 mg, 4 mg, IntraVENous, Q6H PRN, Veronica Covarrubiasmer, DO    polyethylene glycol (GLYCOLAX) packet 17 g, 17 g, Oral, Daily PRN, Veronica Covarrubiasmer, DO    acetaminophen (TYLENOL) tablet 650 mg, 650 mg, Oral, Q6H PRN, 650 mg at 11/16/22 0323 **OR** acetaminophen (TYLENOL) suppository 650 mg, 650 mg, Rectal, Q6H PRN, Veronica Kohler Malmer, DO    cefTRIAXone (ROCEPHIN) 1,000 mg in sterile water 10 mL IV syringe, 1,000 mg, IntraVENous, Q24H, Veronica Peterson, DO, 1,000 mg at 11/15/22 1711    enoxaparin Sodium (LOVENOX) injection 30 mg, 30 mg, SubCUTAneous, Daily, Veronica Covarrubiasmer, DO, 30 mg at 11/16/22 3542    haloperidol lactate (HALDOL) injection 2 mg, 2 mg, IntraMUSCular, Q6H PRN, Veronica Covarrubiasmer, DO, 2 mg at 11/16/22 1240    melatonin tablet 3 mg, 3 mg, Oral, Nightly, Kim Fallow, APRN - CNP    divalproex (DEPAKOTE SPRINKLE) capsule 125 mg, 125 mg, Oral, Q8H **OR** valproate (DEPACON) 125 mg in dextrose 5 % 100 mL IVPB, 125 mg, IntraVENous, Q8H, Rebecca Kelley DO, Stopped at 11/16/22 0987    Facility-Administered Medications Ordered in Other Encounters:     ondansetron (ZOFRAN) injection 4 mg, 4 mg, IntraVENous, Once, John D Weilbacker, DO    0.9 % sodium chloride bolus, 1,000 mL, IntraVENous, Once, John D Weilbacker, DO    Objective:    /76   Pulse 82   Temp 97.7 °F (36.5 °C) (Temporal)   Resp 20   Ht 4' 9\" (1.448 m)   Wt 104 lb (47.2 kg)   SpO2 93%   BMI 22.51 kg/m²     Heart:  reg  Lungs:  ctab  Abd: + bs soft nontender  Extrem:  w/o edema    CBC with Differential:    Lab Results   Component Value Date/Time    WBC 9.4 11/14/2022 02:59 PM    RBC 4.99 11/14/2022 02:59 PM    HGB 14.8 11/14/2022 02:59 PM    HCT 45.8 11/14/2022 02:59 PM     11/14/2022 02:59 PM    MCV 91.8 11/14/2022 02:59 PM    MCH 29.7 11/14/2022 02:59 PM    MCHC 32.3 11/14/2022 02:59 PM    RDW 14.1 11/14/2022 02:59 PM    LYMPHOPCT 7.0 11/14/2022 02:59 PM    MONOPCT 7.5 11/14/2022 02:59 PM    BASOPCT 0.4 11/14/2022 02:59 PM    MONOSABS 0.70 11/14/2022 02:59 PM    LYMPHSABS 0.66 11/14/2022 02:59 PM    EOSABS 0.00 11/14/2022 02:59 PM    BASOSABS 0.04 11/14/2022 02:59 PM     CMP:    Lab Results   Component Value Date/Time     11/14/2022 02:59 PM    K 4.2 11/14/2022 02:59 PM    CL 98 11/14/2022 02:59 PM    CO2 25 11/14/2022 02:59 PM    BUN 14 11/14/2022 02:59 PM    CREATININE 1.0 11/14/2022 02:59 PM    GFRAA 51 05/11/2017 09:00 AM    LABGLOM 52 11/14/2022 02:59 PM    GLUCOSE 119 11/14/2022 02:59 PM    PROT 8.8 11/14/2022 02:59 PM    LABALBU 4.5 11/14/2022 02:59 PM    CALCIUM 10.7 11/14/2022 02:59 PM    BILITOT 0.4 11/14/2022 02:59 PM    ALKPHOS 86 11/14/2022 02:59 PM    AST 34 11/14/2022 02:59 PM    ALT 12 11/14/2022 02:59 PM     Warfarin PT/INR:    Lab Results   Component Value Date    INR 1.2 09/23/2016    INR 2.6 03/22/2014    INR 2.9 03/21/2014    PROTIME 12.9 (H) 09/23/2016    PROTIME 19.5 (H) 03/22/2014    PROTIME 20.2 (H) 03/21/2014       Assessment:    Principal Problem:    Acute delirium  Active Problems:    Altered mental status  Resolved Problems:    * No resolved hospital problems.  *      Plan:  Cont atb pt/ot eval increase activity        Su Garcia DO  1:26 PM  11/16/2022

## 2022-11-17 PROCEDURE — 2580000003 HC RX 258: Performed by: INTERNAL MEDICINE

## 2022-11-17 PROCEDURE — 97530 THERAPEUTIC ACTIVITIES: CPT

## 2022-11-17 PROCEDURE — 6370000000 HC RX 637 (ALT 250 FOR IP): Performed by: PSYCHIATRY & NEUROLOGY

## 2022-11-17 PROCEDURE — 6370000000 HC RX 637 (ALT 250 FOR IP): Performed by: INTERNAL MEDICINE

## 2022-11-17 PROCEDURE — 6370000000 HC RX 637 (ALT 250 FOR IP): Performed by: NURSE PRACTITIONER

## 2022-11-17 PROCEDURE — 2580000003 HC RX 258: Performed by: PSYCHIATRY & NEUROLOGY

## 2022-11-17 PROCEDURE — 2060000000 HC ICU INTERMEDIATE R&B

## 2022-11-17 PROCEDURE — 92523 SPEECH SOUND LANG COMPREHEN: CPT | Performed by: SPEECH-LANGUAGE PATHOLOGIST

## 2022-11-17 PROCEDURE — 6360000002 HC RX W HCPCS: Performed by: INTERNAL MEDICINE

## 2022-11-17 PROCEDURE — 97129 THER IVNTJ 1ST 15 MIN: CPT | Performed by: SPEECH-LANGUAGE PATHOLOGIST

## 2022-11-17 PROCEDURE — 2500000003 HC RX 250 WO HCPCS: Performed by: PSYCHIATRY & NEUROLOGY

## 2022-11-17 PROCEDURE — 97535 SELF CARE MNGMENT TRAINING: CPT

## 2022-11-17 RX ADMIN — DEXTROSE MONOHYDRATE 125 MG: 50 INJECTION, SOLUTION INTRAVENOUS at 01:38

## 2022-11-17 RX ADMIN — ACETAMINOPHEN 650 MG: 325 TABLET, FILM COATED ORAL at 16:43

## 2022-11-17 RX ADMIN — Medication 10 ML: at 20:54

## 2022-11-17 RX ADMIN — MELATONIN 3 MG ORAL TABLET 3 MG: 3 TABLET ORAL at 20:53

## 2022-11-17 RX ADMIN — DIVALPROEX SODIUM 125 MG: 125 CAPSULE ORAL at 18:40

## 2022-11-17 RX ADMIN — Medication 10 ML: at 08:40

## 2022-11-17 RX ADMIN — CEFTRIAXONE 1000 MG: 1 INJECTION, POWDER, FOR SOLUTION INTRAMUSCULAR; INTRAVENOUS at 16:43

## 2022-11-17 RX ADMIN — ACETAMINOPHEN 650 MG: 325 TABLET, FILM COATED ORAL at 10:09

## 2022-11-17 RX ADMIN — ENOXAPARIN SODIUM 30 MG: 100 INJECTION SUBCUTANEOUS at 08:40

## 2022-11-17 RX ADMIN — DIVALPROEX SODIUM 125 MG: 125 CAPSULE ORAL at 08:40

## 2022-11-17 ASSESSMENT — PAIN DESCRIPTION - FREQUENCY
FREQUENCY: CONTINUOUS
FREQUENCY: CONTINUOUS

## 2022-11-17 ASSESSMENT — PAIN DESCRIPTION - ORIENTATION
ORIENTATION: RIGHT
ORIENTATION: RIGHT

## 2022-11-17 ASSESSMENT — PAIN DESCRIPTION - PAIN TYPE
TYPE: CHRONIC PAIN
TYPE: CHRONIC PAIN

## 2022-11-17 ASSESSMENT — PAIN DESCRIPTION - DESCRIPTORS
DESCRIPTORS: ACHING;SORE;SHOOTING
DESCRIPTORS: SHARP;PRESSURE;POUNDING

## 2022-11-17 ASSESSMENT — PAIN SCALES - GENERAL
PAINLEVEL_OUTOF10: 9
PAINLEVEL_OUTOF10: 4
PAINLEVEL_OUTOF10: 0
PAINLEVEL_OUTOF10: 0

## 2022-11-17 ASSESSMENT — PAIN - FUNCTIONAL ASSESSMENT
PAIN_FUNCTIONAL_ASSESSMENT: PREVENTS OR INTERFERES SOME ACTIVE ACTIVITIES AND ADLS
PAIN_FUNCTIONAL_ASSESSMENT: PREVENTS OR INTERFERES SOME ACTIVE ACTIVITIES AND ADLS

## 2022-11-17 ASSESSMENT — PAIN DESCRIPTION - ONSET
ONSET: ON-GOING
ONSET: ON-GOING

## 2022-11-17 ASSESSMENT — PAIN DESCRIPTION - LOCATION
LOCATION: RIB CAGE;BACK
LOCATION: RIB CAGE

## 2022-11-17 NOTE — PROGRESS NOTES
SPEECH/LANGUAGE PATHOLOGY  SPEECH/LANGUAGE/COGNITIVE EVALUATION   and PLAN OF CARE      PATIENT NAME:  Anabela Maya  (female)     MRN:  [id number]    :  1929  (80 y.o.)  STATUS:  Inpatient: Room 8516/8516-B    TODAY'S DATE:  2022  11/15/22 1115    SLP cognitive language evaluation  Start:  11/15/22 1115,   End:  11/15/22 1115,   ONE TIME,   Standing Count:  1 Occurrences,   R         Agatha Peterson DO   REASON FOR REFERRAL:  acute delirium   EVALUATING THERAPIST: STEFFANY Fleming    ADMITTING DIAGNOSIS: Acute delirium [R41.0]  Acute cystitis without hematuria [N30.00]  Altered mental status, unspecified altered mental status type [R41.82]    VISIT DIAGNOSIS:   Visit Diagnoses         Codes    Altered mental status, unspecified altered mental status type    -  Primary R41.82    Acute cystitis without hematuria     N30.00               SPEECH THERAPY  PLAN OF CARE   The speech therapy  POC is established based on physician order, speech pathology diagnosis and results of clinical assessment     SPEECH PATHOLOGY DIAGNOSIS:    atleast Moderate cognitive-linguistic deficits, possible exp aphasia      Speech Pathology intervention is recommended up to 6 times per week for LOS or when goals are met with emphasis on the following:      Conditions Requiring Skilled Therapeutic Intervention for speech, language and/or cognition    Cognitive linguistic impairment    Specific Speech Therapy Interventions to Include: Therapeutic tasks for Cognition    Specific instructions for next treatment: To initiate POC    SHORT/LONG TERM GOALS  Pt will improve orientation to spatial and temporal surroundings with use of external memory aides.   Pt will improve immediate, short term, recent memory during structured and unstructured tasks with 75% accuracy   Pt will improve problem solving/thought organization during structured and unstructured tasks with 75% accuracy   Pt will improve receptive and expressive language skills with adequate thought content, organization, and processing time to facilitate improved communication with moderate. Patient goals: Patient/family involved in developing goals and treatment plan:   Treatment goals discussed with Patient    The Patient understand(s) the diagnosis, prognosis and plan of care   The patient/family Agreed with above,     This plan may be re-evaluated and revised as warranted. Rehabilitation Potential/Prognosis: fair                CLINICAL ASSESSMENT:  MOTOR SPEECH       Oral Peripheral Examination   Adequate lingual/labial strength     Parameters of Speech Production  Respiration:  Adequate for speech production  Articulation:  Within functional limits  Resonance:  Within functional limits  Quality:   Within functional limits  Pitch: Within functional limits  Intensity: Within functional limits  Fluency:  Intact  Prosody Intact    RECEPTIVE LANGUAGE    Comprehension of Yes/No Questions:   Latent    Process  Simple Verbal Commands:   Latent  Process Intermediate Verbal Commands:   Could not test  Process Complex Verbal Commands:     Could not test    Comprehension of Conversation:      Latent and Inconsistent      EXPRESSIVE LANGUAGE     Serials: To be assessed    Imitation:  Words   Functional   Sentences To be assessed    Naming:  (Modality used:  Verbal)  Confrontation Naming  To be assessed  Functional Description  Functional  Response Naming: Functional    Conversation:      Confusion was noted during conversation    COGNITION     Attention/Orientation  Attention: Sustained attention   Orientation:  Oriented to Person, Place    Memory   Immediate Recall: Repeated 3/3    Delayed Recall:   Recalled 0/3    Long Term Recall:   Recalled Address, Birthdate, and Family    Organization/Problem Solving/Reasoning   Verbal Sequencing:    To be assessed        Verbal Problem solving:   Impaired          CLINICAL OBSERVATIONS NOTED DURING THE EVALUATION  Latent responses, Inconsistent responses, Flat affect, and Reduced eye contact                  EDUCATION:   The Speech Language Pathologist (SLP) completed education regarding results of evaluation and that intervention is warranted at this time. Learner: Patient  Education: Reviewed results and recommendations of this evaluation  Evaluation of Education:  No evidence of learning    Evaluation Time includes thorough review of current medical information, gathering information on past medical history/social history and prior level of function, completion of standardized testing/informal observation of tasks, assessment of data and education on plan of care and goals. CPT code:    04916  eval speech sound lang comprehension      The admitting diagnosis and active problem list, as listed below have been reviewed prior to initiation of this evaluation. ACTIVE PROBLEM LIST:   Patient Active Problem List   Diagnosis    Osteoarthritis of left knee    HTN (hypertension), benign    Mixed hyperlipidemia    Mass of pancreas    Shoulder dislocation    Acute delirium    Altered mental status       INTERVENTION  CPT Code: 70537  therapeutic interventions that focus on cognitive function , initial  15 min    Pt provided cog intervention tasks to facilitate increased recall. Pt provided semantic cues, Pt declined multiple options as it \"wouldn't help\".  Pt provided directing cues to facilitate increased attention to task and overall success but was limited beneficial.     Shaina Richmond., CCC-SLP  Speech-Language Pathologist  [id number]  11/17/2022

## 2022-11-17 NOTE — PLAN OF CARE
Problem: Discharge Planning  Goal: Discharge to home or other facility with appropriate resources  Outcome: Progressing     Problem: Safety - Adult  Goal: Free from fall injury  Outcome: Progressing     Problem: Skin/Tissue Integrity  Goal: Absence of new skin breakdown  Outcome: Progressing

## 2022-11-17 NOTE — PROGRESS NOTES
Patient's daughter at bedside and wants mother to be a DNR-CC. Called Dr. Sarah Beth Frances answering service and notified of wanting it changed.

## 2022-11-17 NOTE — PROGRESS NOTES
Hospital Medicine    Subjective:  pt alert responsive to verbal stimuli      Current Facility-Administered Medications:     haloperidol lactate (HALDOL) injection 2 mg, 2 mg, IntraVENous, Q6H PRN, Scooter Peterson DO    pantoprazole (PROTONIX) tablet 40 mg, 40 mg, Oral, QAM AC, Scooter Peterson DO    sodium chloride flush 0.9 % injection 5-40 mL, 5-40 mL, IntraVENous, 2 times per day, Kelsy Morgan DO, 10 mL at 11/16/22 2105    sodium chloride flush 0.9 % injection 5-40 mL, 5-40 mL, IntraVENous, PRN, Scooter Peterson DO, 10 mL at 11/15/22 1748    0.9 % sodium chloride infusion, , IntraVENous, PRN, Scooter Peterson DO    ondansetron (ZOFRAN-ODT) disintegrating tablet 4 mg, 4 mg, Oral, Q8H PRN **OR** ondansetron (ZOFRAN) injection 4 mg, 4 mg, IntraVENous, Q6H PRN, Scooter Peterson DO    polyethylene glycol (GLYCOLAX) packet 17 g, 17 g, Oral, Daily PRN, Scooter Peterson DO    acetaminophen (TYLENOL) tablet 650 mg, 650 mg, Oral, Q6H PRN, 650 mg at 11/16/22 2107 **OR** acetaminophen (TYLENOL) suppository 650 mg, 650 mg, Rectal, Q6H PRN, Scooter Peterson DO    cefTRIAXone (ROCEPHIN) 1,000 mg in sterile water 10 mL IV syringe, 1,000 mg, IntraVENous, Q24H, Scooter Peterson DO, 1,000 mg at 11/16/22 1622    enoxaparin Sodium (LOVENOX) injection 30 mg, 30 mg, SubCUTAneous, Daily, Scooter Peterson DO, 30 mg at 11/16/22 0858    melatonin tablet 3 mg, 3 mg, Oral, Nightly, Vinicio Stockton APRN - CNP, 3 mg at 11/16/22 2105    divalproex (DEPAKOTE SPRINKLE) capsule 125 mg, 125 mg, Oral, Q8H, 125 mg at 11/16/22 1728 **OR** valproate (DEPACON) 125 mg in dextrose 5 % 100 mL IVPB, 125 mg, IntraVENous, Q8H, Zan Gonzalez DO, Stopped at 11/17/22 0240    Facility-Administered Medications Ordered in Other Encounters:     ondansetron (ZOFRAN) injection 4 mg, 4 mg, IntraVENous, Once, John D Weilbacker, DO    0.9 % sodium chloride bolus, 1,000 mL, IntraVENous, Once, John D Weilbacker, DO    Objective:    BP (!) 143/80 Pulse 77   Temp 97.6 °F (36.4 °C) (Temporal)   Resp 20   Ht 4' 9\" (1.448 m)   Wt 104 lb (47.2 kg)   SpO2 94%   BMI 22.51 kg/m²     Heart:  reg  Lungs:  ctab  Abd: + bs soft nontender  Extrem:  w/o edema    CBC with Differential:    Lab Results   Component Value Date/Time    WBC 9.4 11/14/2022 02:59 PM    RBC 4.99 11/14/2022 02:59 PM    HGB 14.8 11/14/2022 02:59 PM    HCT 45.8 11/14/2022 02:59 PM     11/14/2022 02:59 PM    MCV 91.8 11/14/2022 02:59 PM    MCH 29.7 11/14/2022 02:59 PM    MCHC 32.3 11/14/2022 02:59 PM    RDW 14.1 11/14/2022 02:59 PM    LYMPHOPCT 7.0 11/14/2022 02:59 PM    MONOPCT 7.5 11/14/2022 02:59 PM    BASOPCT 0.4 11/14/2022 02:59 PM    MONOSABS 0.70 11/14/2022 02:59 PM    LYMPHSABS 0.66 11/14/2022 02:59 PM    EOSABS 0.00 11/14/2022 02:59 PM    BASOSABS 0.04 11/14/2022 02:59 PM     CMP:    Lab Results   Component Value Date/Time     11/14/2022 02:59 PM    K 4.2 11/14/2022 02:59 PM    CL 98 11/14/2022 02:59 PM    CO2 25 11/14/2022 02:59 PM    BUN 14 11/14/2022 02:59 PM    CREATININE 1.0 11/14/2022 02:59 PM    GFRAA 51 05/11/2017 09:00 AM    LABGLOM 52 11/14/2022 02:59 PM    GLUCOSE 119 11/14/2022 02:59 PM    PROT 8.8 11/14/2022 02:59 PM    LABALBU 4.5 11/14/2022 02:59 PM    CALCIUM 10.7 11/14/2022 02:59 PM    BILITOT 0.4 11/14/2022 02:59 PM    ALKPHOS 86 11/14/2022 02:59 PM    AST 34 11/14/2022 02:59 PM    ALT 12 11/14/2022 02:59 PM     Warfarin PT/INR:    Lab Results   Component Value Date    INR 1.2 09/23/2016    INR 2.6 03/22/2014    INR 2.9 03/21/2014    PROTIME 12.9 (H) 09/23/2016    PROTIME 19.5 (H) 03/22/2014    PROTIME 20.2 (H) 03/21/2014       Assessment:    Principal Problem:    Acute delirium  Active Problems:    Altered mental status  Resolved Problems:    * No resolved hospital problems.  *      Plan:  Cont atb increase activity await pt katie anderson planning        Devora Henry DO  7:58 AM  11/17/2022

## 2022-11-17 NOTE — CARE COORDINATION
Here for AMS. Continues on IV rocephin for UTI. Met with daughter Nora Lucas on room and discussed pt / ot evals 11 /24 and 11/24. Also discussed prior cm note-  DME for WC ordered 11/15/22. [facility] referral with soc Sat. Daughter hoping she can get more therapy  before discharging. Her plan remains to take  her home. Electronically signed by Terrie Diaz RN on 11/17/2022 at 11:59 AM

## 2022-11-17 NOTE — PROGRESS NOTES
Speech Language Pathology      NAME:  Mynor Lerner  :  1929  DATE: 2022  ROOM:  8516/8516-B    Attempted cog-ling eval again this date, Pt in pain and waiting for medication. Will re-attempt as able.      Acute delirium [R41.0]  Acute cystitis without hematuria [N30.00]  Altered mental status, unspecified altered mental status type [R41.82]

## 2022-11-17 NOTE — PROGRESS NOTES
Occupational Therapy  OT BEDSIDE TREATMENT NOTE   [address]  [facility]:  Patient Name: Camella Boeck  MRN: [id number]  : 1929  Room: [address] [place] [address]     Per OT Eval:    Evaluating OT: Juli Caal, [address] OTR/L; [id number]       Referring Provider: Sb Byers DO    Specific Provider Orders/Date: OT Eval and Treat 11/15/22       Diagnosis: Acute delirium  Altered mental status    Surgery: none this admission     Pertinent Medical History:  has a past medical history of Chronic back pain, Hyperlipidemia, Hypertension, Osteoarthritis, and Osteoporosis.        Reason for Admission: per daughter pt has been combative and confused, history of UTI, daughter worries she may currently have a UTI     Recommended Adaptive Equipment:  TBD pending progression      Precautions:  Fall Risk, Sitter, Cognition/Dementia, R Anterior Rib Fx 3-4 mildly displaced, R Anterior Rib Fx 5th nondisplaced      Assessment of current deficits:    [x] Functional mobility            [x]ADLs           [x] Strength                  [x]Cognition    [x] Functional transfers          [x] IADLs         [x] Safety Awareness   [x]Endurance    [x] Fine Coordination                         [x] Balance      [] Vision/perception   []Sensation      []Gross Motor Coordination             [] ROM           [] Delirium                   [] Motor Control      OT PLAN OF CARE   OT POC based on physician orders, patient diagnosis and results of clinical assessment     Frequency/Duration: 1-3 days/wk for 2 weeks PRN   Specific OT Treatment Interventions to include:   * Instruction/training on adapted ADL techniques and AE recommendations to increase functional independence within precautions       * Training on energy conservation strategies, correct breathing pattern and techniques to improve independence/tolerance for self-care routine  * Functional transfer/mobility training/DME recommendations for increased independence, safety, and fall prevention  * Patient/Family education to increase follow through with safety techniques and functional independence  * Recommendation of environmental modifications for increased safety with functional transfers/mobility and ADLs  * Therapeutic exercise to improve motor endurance, ROM, and functional strength for ADLs/functional transfers  * Therapeutic activities to facilitate/challenge dynamic balance, stand tolerance for increased safety and independence with ADLs     Home Living: Pt unreliable historian d/t cognition/dementia at baseline. Bathroom setup: ?   Equipment owned: unknown     Prior Level of Function: ADLs /IADLs ? Ambulated with ?      Pain Level: severe pain R ribs - pt bracing R ribs unable to state location just repeatedly stating \"it hurts\"  Cognition: A&O:x 2, self & place, baseline not able to recall month & year, pleasant & cooperative, minimally verbal but did answer questions appropriately when asked  Follows single 1 step directions -              Memory: Fair-               Sequencing: Fair-              Problem solving:  poor              Judgement/safety:  poor                Functional Assessment:  AM-PAC Daily Activity Raw Score: 14/24    Initial Eval Status  Date: 11/16/22 Treatment Status  Date:  11/17/22 STGs = LTGs  Time frame: 10-14 days   Feeding Minimal Assist   Min A  Using R UE to hold cup  Independent    Grooming Minimal Assist   Min A   Simple tasks seated, brushing her hair, uses L more than R UE due to limited ROM (chronic per daughter) Independent    UB Dressing Moderate Assist   Mod A  Limited ROM R UE & pain in rib areas, continue education on modified techniques  Stand by Assist    LB Dressing Dependent   Bakari pants seated EOB   Managing over hips  Max A  Pants already in place, limited safe reach & minimal effort to assist over hips with toileting tasks   Minimal Assist Bathing Dependent  Max A  Simulated  Minimal Assist    Toileting Dependent   Max A  Using regular commode, urinated but declined to attempt hygiene  Minimal Assist    Bed Mobility  Log Roll: NT  Supine to sit: Max  A x 2   Sit to supine: Max  A x 2   Min A  Supine to sit  Supine to sit: Min A   Sit to supine: Min A    Functional Transfers Sit to stand: Max  A x 2 with HHA   Stand to sit: Max  A x 2 with HHA  Stand pivot: NT  Commode: NT  Min A  Cues for hand placement & safety  Sit to stand:Min A    Stand to sit:Min A   Stand pivot: Min A   Commode: Min A     Functional Mobility NT  Min A  With walker in room distances with significant improvement  Min A    Balance Sitting:     Static - CGA <> Min A     Dynamic - Min A  Standing: Max  A x 2 with HHA  Sitting: CGA/Min A  Standing: Min A  With walker  Sitting:  Static: SBA  Dynamic: SBA  Standing: Min A   Activity Tolerance FAIR -   Severe R rib pain with all movement required increased time and rest breaks   Pt stated \"It hurts\" and requesting \"give me something\" RN notified   Sitter present and aware Fair/Fair-  Pain/discomfort at times with R ribs, light tasks, fatigues quickly  GOOD   Visual/  Perceptual Glasses: none present          Vitals WFL             BUE  ROM/Strength/  Fine motor Coordination Hand dominance: Right     RUE: limited d/t multiple rib fx     Strength: 3-/5      Strength: POOR     Coordination:  POOR     LUE: difficult to assess d/t limited command following limited AROM noted     Strength: 3-/5      Strength: FAIR     Coordination: FAIR   increase BUE muscle strength for improved indep with functional transfers        Education:  Pt was educated through out treatment regarding proper technique & safety with bed mobility, functional transfers & mobility, importance of OOB activity & ADL retraining to ease tasks, improve safety & prevent falls to return home safely.   Daughter present throughout session, discussed home set up & made recommendations, answered all questions as able. Daughter demonstrated good understanding of current level of care. Comments: Upon arrival pt was in bed & agreeable for therapy. At end of session pt was seated in chair, daughter present along with staff member, all lines and tubes intact, call light within reach. Pt has made Fair+ progress towards set goals. Continue with current plan of care      Treatment Time In:10:12            Treatment Time Out: 10:50           Treatment Charges: Mins Units   Ther Ex  [id number]     Manual Therapy 57632     Thera Activities [id number] 13 1   ADL/Home Mgt 58722 25 2   Neuro Re-ed 51863     Group Therapy      Orthotic manage/training  [id number]     Non-Billable Time     Total Timed Treatment 38 3       Ayleen PEÑA  [address] [place] [address], [address] [place] [address]

## 2022-11-17 NOTE — PROGRESS NOTES
Per Dr. Espino Monday, ok to cancel Full Code status and initiate DNR-CC status per daughter's request.

## 2022-11-17 NOTE — PROGRESS NOTES
Patient is refusing to be turned every two hours. Daughter at bedside states she has a curvature of the spine and chronic pain on her right side and is unable to tolerate turns. Educated on the importance of repositioning. Patient and daughter kindly requested we only turn to the left or supine for patient's comfort.

## 2022-11-18 LAB
ALBUMIN SERPL-MCNC: 3.4 G/DL (ref 3.5–5.2)
ALP BLD-CCNC: 68 U/L (ref 35–104)
ALT SERPL-CCNC: 22 U/L (ref 0–32)
ANION GAP SERPL CALCULATED.3IONS-SCNC: 12 MMOL/L (ref 7–16)
AST SERPL-CCNC: 37 U/L (ref 0–31)
BILIRUB SERPL-MCNC: 0.3 MG/DL (ref 0–1.2)
BUN BLDV-MCNC: 12 MG/DL (ref 6–23)
CALCIUM SERPL-MCNC: 9.1 MG/DL (ref 8.6–10.2)
CHLORIDE BLD-SCNC: 101 MMOL/L (ref 98–107)
CO2: 23 MMOL/L (ref 22–29)
CREAT SERPL-MCNC: 0.8 MG/DL (ref 0.5–1)
GFR SERPL CREATININE-BSD FRML MDRD: >60 ML/MIN/1.73
GLUCOSE BLD-MCNC: 100 MG/DL (ref 74–99)
HCT VFR BLD CALC: 37.3 % (ref 34–48)
HEMOGLOBIN: 12.5 G/DL (ref 11.5–15.5)
LACTIC ACID: 0.8 MMOL/L (ref 0.5–2.2)
MCH RBC QN AUTO: 29.7 PG (ref 26–35)
MCHC RBC AUTO-ENTMCNC: 33.5 % (ref 32–34.5)
MCV RBC AUTO: 88.6 FL (ref 80–99.9)
PDW BLD-RTO: 13.9 FL (ref 11.5–15)
PLATELET # BLD: 229 E9/L (ref 130–450)
PMV BLD AUTO: 9.9 FL (ref 7–12)
POTASSIUM SERPL-SCNC: 3.9 MMOL/L (ref 3.5–5)
RBC # BLD: 4.21 E12/L (ref 3.5–5.5)
SODIUM BLD-SCNC: 136 MMOL/L (ref 132–146)
TOTAL PROTEIN: 6.6 G/DL (ref 6.4–8.3)
WBC # BLD: 4.6 E9/L (ref 4.5–11.5)

## 2022-11-18 PROCEDURE — 2060000000 HC ICU INTERMEDIATE R&B

## 2022-11-18 PROCEDURE — 2580000003 HC RX 258: Performed by: PSYCHIATRY & NEUROLOGY

## 2022-11-18 PROCEDURE — 80053 COMPREHEN METABOLIC PANEL: CPT

## 2022-11-18 PROCEDURE — 6370000000 HC RX 637 (ALT 250 FOR IP): Performed by: NURSE PRACTITIONER

## 2022-11-18 PROCEDURE — 6370000000 HC RX 637 (ALT 250 FOR IP): Performed by: PSYCHIATRY & NEUROLOGY

## 2022-11-18 PROCEDURE — 2500000003 HC RX 250 WO HCPCS: Performed by: PSYCHIATRY & NEUROLOGY

## 2022-11-18 PROCEDURE — 36415 COLL VENOUS BLD VENIPUNCTURE: CPT

## 2022-11-18 PROCEDURE — 2580000003 HC RX 258: Performed by: INTERNAL MEDICINE

## 2022-11-18 PROCEDURE — 85027 COMPLETE CBC AUTOMATED: CPT

## 2022-11-18 PROCEDURE — 83605 ASSAY OF LACTIC ACID: CPT

## 2022-11-18 PROCEDURE — 6360000002 HC RX W HCPCS: Performed by: INTERNAL MEDICINE

## 2022-11-18 PROCEDURE — 6370000000 HC RX 637 (ALT 250 FOR IP): Performed by: INTERNAL MEDICINE

## 2022-11-18 RX ADMIN — ACETAMINOPHEN 650 MG: 325 TABLET, FILM COATED ORAL at 16:37

## 2022-11-18 RX ADMIN — ACETAMINOPHEN 650 MG: 325 TABLET, FILM COATED ORAL at 05:45

## 2022-11-18 RX ADMIN — PANTOPRAZOLE SODIUM 40 MG: 40 TABLET, DELAYED RELEASE ORAL at 05:43

## 2022-11-18 RX ADMIN — MELATONIN 3 MG ORAL TABLET 3 MG: 3 TABLET ORAL at 20:49

## 2022-11-18 RX ADMIN — Medication 10 ML: at 10:03

## 2022-11-18 RX ADMIN — DEXTROSE MONOHYDRATE 125 MG: 50 INJECTION, SOLUTION INTRAVENOUS at 02:43

## 2022-11-18 RX ADMIN — HALOPERIDOL LACTATE 2 MG: 5 INJECTION, SOLUTION INTRAMUSCULAR at 02:32

## 2022-11-18 RX ADMIN — ENOXAPARIN SODIUM 30 MG: 100 INJECTION SUBCUTANEOUS at 10:03

## 2022-11-18 RX ADMIN — DIVALPROEX SODIUM 125 MG: 125 CAPSULE ORAL at 18:02

## 2022-11-18 RX ADMIN — CEFTRIAXONE 1000 MG: 1 INJECTION, POWDER, FOR SOLUTION INTRAMUSCULAR; INTRAVENOUS at 16:37

## 2022-11-18 RX ADMIN — DIVALPROEX SODIUM 125 MG: 125 CAPSULE ORAL at 10:03

## 2022-11-18 ASSESSMENT — PAIN DESCRIPTION - PAIN TYPE: TYPE: CHRONIC PAIN

## 2022-11-18 ASSESSMENT — PAIN DESCRIPTION - DESCRIPTORS: DESCRIPTORS: ACHING;DISCOMFORT

## 2022-11-18 ASSESSMENT — PAIN DESCRIPTION - ONSET: ONSET: ON-GOING

## 2022-11-18 ASSESSMENT — PAIN DESCRIPTION - LOCATION: LOCATION: CHEST

## 2022-11-18 ASSESSMENT — PAIN - FUNCTIONAL ASSESSMENT: PAIN_FUNCTIONAL_ASSESSMENT: PREVENTS OR INTERFERES SOME ACTIVE ACTIVITIES AND ADLS

## 2022-11-18 ASSESSMENT — PAIN SCALES - GENERAL
PAINLEVEL_OUTOF10: 3
PAINLEVEL_OUTOF10: 0

## 2022-11-18 ASSESSMENT — PAIN DESCRIPTION - ORIENTATION: ORIENTATION: RIGHT

## 2022-11-18 ASSESSMENT — PAIN DESCRIPTION - FREQUENCY: FREQUENCY: INTERMITTENT

## 2022-11-18 NOTE — PROGRESS NOTES
Hospital Medicine    Subjective:  pt seen this am alert conversive cooperative      Current Facility-Administered Medications:     haloperidol lactate (HALDOL) injection 2 mg, 2 mg, IntraVENous, Q6H PRN, Lucrecia Glassing Malmer, DO, 2 mg at 11/18/22 0232    pantoprazole (PROTONIX) tablet 40 mg, 40 mg, Oral, QAM AC, Lucrecia Glassing Malmer, DO, 40 mg at 11/18/22 0543    sodium chloride flush 0.9 % injection 5-40 mL, 5-40 mL, IntraVENous, 2 times per day, Louetta Bustle, DO, 10 mL at 11/18/22 1003    sodium chloride flush 0.9 % injection 5-40 mL, 5-40 mL, IntraVENous, PRN, Lucrecia Glassing Malmer, DO, 10 mL at 11/15/22 1748    0.9 % sodium chloride infusion, , IntraVENous, PRN, Lucrecia Glassing Malmer, DO    ondansetron (ZOFRAN-ODT) disintegrating tablet 4 mg, 4 mg, Oral, Q8H PRN **OR** ondansetron (ZOFRAN) injection 4 mg, 4 mg, IntraVENous, Q6H PRN, Lucrecia Glassing Malmer, DO    polyethylene glycol (GLYCOLAX) packet 17 g, 17 g, Oral, Daily PRN, Lucrecia Glassing Malmer, DO    acetaminophen (TYLENOL) tablet 650 mg, 650 mg, Oral, Q6H PRN, 650 mg at 11/18/22 0545 **OR** acetaminophen (TYLENOL) suppository 650 mg, 650 mg, Rectal, Q6H PRN, Lucrecia Glassing Malmer, DO    cefTRIAXone (ROCEPHIN) 1,000 mg in sterile water 10 mL IV syringe, 1,000 mg, IntraVENous, Q24H, Lucrecia Glassing Malmer, DO, 1,000 mg at 11/17/22 1643    enoxaparin Sodium (LOVENOX) injection 30 mg, 30 mg, SubCUTAneous, Daily, Lucrecia Glassing Malmer, DO, 30 mg at 11/18/22 1003    melatonin tablet 3 mg, 3 mg, Oral, Nightly, Cierra Goff, APRN - CNP, 3 mg at 11/17/22 2053    divalproex (DEPAKOTE SPRINKLE) capsule 125 mg, 125 mg, Oral, Q8H, 125 mg at 11/18/22 1003 **OR** valproate (DEPACON) 125 mg in dextrose 5 % 100 mL IVPB, 125 mg, IntraVENous, Q8H, Lisset Giron DO, Stopped at 11/18/22 9399    Facility-Administered Medications Ordered in Other Encounters:     ondansetron (ZOFRAN) injection 4 mg, 4 mg, IntraVENous, Once, John D Weilbacker, DO    0.9 % sodium chloride bolus, 1,000 mL, IntraVENous, Once, Theresa Lopez Weilbacker, DO    Objective:    /70   Pulse 82   Temp 97.9 °F (36.6 °C) (Temporal)   Resp 16   Ht 4' 9\" (1.448 m)   Wt 104 lb (47.2 kg)   SpO2 92%   BMI 22.51 kg/m²     Heart:  reg  Lungs:  ctab  Abd: + bs soft nontender  Extrem:  w/o edema    CBC with Differential:    Lab Results   Component Value Date/Time    WBC 4.6 11/18/2022 06:16 AM    RBC 4.21 11/18/2022 06:16 AM    HGB 12.5 11/18/2022 06:16 AM    HCT 37.3 11/18/2022 06:16 AM     11/18/2022 06:16 AM    MCV 88.6 11/18/2022 06:16 AM    MCH 29.7 11/18/2022 06:16 AM    MCHC 33.5 11/18/2022 06:16 AM    RDW 13.9 11/18/2022 06:16 AM    LYMPHOPCT 7.0 11/14/2022 02:59 PM    MONOPCT 7.5 11/14/2022 02:59 PM    BASOPCT 0.4 11/14/2022 02:59 PM    MONOSABS 0.70 11/14/2022 02:59 PM    LYMPHSABS 0.66 11/14/2022 02:59 PM    EOSABS 0.00 11/14/2022 02:59 PM    BASOSABS 0.04 11/14/2022 02:59 PM     CMP:    Lab Results   Component Value Date/Time     11/18/2022 06:16 AM    K 3.9 11/18/2022 06:16 AM    K 4.2 11/14/2022 02:59 PM     11/18/2022 06:16 AM    CO2 23 11/18/2022 06:16 AM    BUN 12 11/18/2022 06:16 AM    CREATININE 0.8 11/18/2022 06:16 AM    GFRAA 51 05/11/2017 09:00 AM    LABGLOM >60 11/18/2022 06:16 AM    GLUCOSE 100 11/18/2022 06:16 AM    PROT 6.6 11/18/2022 06:16 AM    LABALBU 3.4 11/18/2022 06:16 AM    CALCIUM 9.1 11/18/2022 06:16 AM    BILITOT 0.3 11/18/2022 06:16 AM    ALKPHOS 68 11/18/2022 06:16 AM    AST 37 11/18/2022 06:16 AM    ALT 22 11/18/2022 06:16 AM     Warfarin PT/INR:    Lab Results   Component Value Date    INR 1.2 09/23/2016    INR 2.6 03/22/2014    INR 2.9 03/21/2014    PROTIME 12.9 (H) 09/23/2016    PROTIME 19.5 (H) 03/22/2014    PROTIME 20.2 (H) 03/21/2014       Assessment:    Principal Problem:    Acute delirium  Active Problems:    Altered mental status  Resolved Problems:    * No resolved hospital problems.  *  uti    Plan:  Cont atb cont pt /o dc planning to rehab        [address] Jovel DO  4:30 PM  11/18/2022

## 2022-11-18 NOTE — PROGRESS NOTES
Patient's daughter would like hearing aids to remain in her ears so they do not get lost. And reminded us that she does not want her mother turned on her right side due to chronic pain. Educated on why we reposition patients every two hours to prevent skin breakdown. Patient's daughter verbalized an understanding, but requested we only turn her on her left side.

## 2022-11-19 PROCEDURE — 6370000000 HC RX 637 (ALT 250 FOR IP): Performed by: PSYCHIATRY & NEUROLOGY

## 2022-11-19 PROCEDURE — 2500000003 HC RX 250 WO HCPCS: Performed by: PSYCHIATRY & NEUROLOGY

## 2022-11-19 PROCEDURE — 2060000000 HC ICU INTERMEDIATE R&B

## 2022-11-19 PROCEDURE — 6370000000 HC RX 637 (ALT 250 FOR IP): Performed by: INTERNAL MEDICINE

## 2022-11-19 PROCEDURE — 2580000003 HC RX 258: Performed by: PSYCHIATRY & NEUROLOGY

## 2022-11-19 PROCEDURE — 6360000002 HC RX W HCPCS: Performed by: INTERNAL MEDICINE

## 2022-11-19 PROCEDURE — 2580000003 HC RX 258: Performed by: INTERNAL MEDICINE

## 2022-11-19 RX ADMIN — Medication 10 ML: at 21:40

## 2022-11-19 RX ADMIN — DIVALPROEX SODIUM 125 MG: 125 CAPSULE ORAL at 10:55

## 2022-11-19 RX ADMIN — DEXTROSE MONOHYDRATE 125 MG: 50 INJECTION, SOLUTION INTRAVENOUS at 21:56

## 2022-11-19 RX ADMIN — PANTOPRAZOLE SODIUM 40 MG: 40 TABLET, DELAYED RELEASE ORAL at 06:10

## 2022-11-19 RX ADMIN — Medication 10 ML: at 09:24

## 2022-11-19 RX ADMIN — DEXTROSE MONOHYDRATE 125 MG: 50 INJECTION, SOLUTION INTRAVENOUS at 03:47

## 2022-11-19 RX ADMIN — CEFTRIAXONE 1000 MG: 1 INJECTION, POWDER, FOR SOLUTION INTRAMUSCULAR; INTRAVENOUS at 18:12

## 2022-11-19 RX ADMIN — ENOXAPARIN SODIUM 30 MG: 100 INJECTION SUBCUTANEOUS at 09:23

## 2022-11-19 ASSESSMENT — PAIN SCALES - PAIN ASSESSMENT IN ADVANCED DEMENTIA (PAINAD)
NEGVOCALIZATION: 0
BODYLANGUAGE: 0
FACIALEXPRESSION: 0
TOTALSCORE: 0
CONSOLABILITY: 0
BREATHING: 0

## 2022-11-19 NOTE — PROGRESS NOTES
Hospital Medicine    Subjective: Patient is awake confused denies new complaints        Current Facility-Administered Medications:     haloperidol lactate (HALDOL) injection 2 mg, 2 mg, IntraVENous, Q6H PRN, Jae Caldron Malmer, DO, 2 mg at 11/18/22 0232    pantoprazole (PROTONIX) tablet 40 mg, 40 mg, Oral, QAM AC, Jae Caldron Malmer, DO, 40 mg at 11/19/22 7320    sodium chloride flush 0.9 % injection 5-40 mL, 5-40 mL, IntraVENous, 2 times per day, Jorge Faden, DO, 10 mL at 11/19/22 7672    sodium chloride flush 0.9 % injection 5-40 mL, 5-40 mL, IntraVENous, PRN, Jae Caldron Malmer, DO, 10 mL at 11/15/22 1748    0.9 % sodium chloride infusion, , IntraVENous, PRN, Jae Caldron Malmer, DO    ondansetron (ZOFRAN-ODT) disintegrating tablet 4 mg, 4 mg, Oral, Q8H PRN **OR** ondansetron (ZOFRAN) injection 4 mg, 4 mg, IntraVENous, Q6H PRN, Jae Caldron Malmer, DO    polyethylene glycol (GLYCOLAX) packet 17 g, 17 g, Oral, Daily PRN, Jae Caldron Malmer, DO    acetaminophen (TYLENOL) tablet 650 mg, 650 mg, Oral, Q6H PRN, 650 mg at 11/18/22 1637 **OR** acetaminophen (TYLENOL) suppository 650 mg, 650 mg, Rectal, Q6H PRN, Jae Caldron Malmer, DO    cefTRIAXone (ROCEPHIN) 1,000 mg in sterile water 10 mL IV syringe, 1,000 mg, IntraVENous, Q24H, Jae Caldron Malmer, DO, 1,000 mg at 11/18/22 1637    enoxaparin Sodium (LOVENOX) injection 30 mg, 30 mg, SubCUTAneous, Daily, Jae Caldron Malmer, DO, 30 mg at 11/19/22 9810    melatonin tablet 3 mg, 3 mg, Oral, Nightly, Reyna Jiménez, APRN - CNP, 3 mg at 11/18/22 2049    divalproex (DEPAKOTE SPRINKLE) capsule 125 mg, 125 mg, Oral, Q8H, 125 mg at 11/19/22 1055 **OR** valproate (DEPACON) 125 mg in dextrose 5 % 100 mL IVPB, 125 mg, IntraVENous, Q8H, Clarence Gale DO, Stopped at 11/19/22 6090    Facility-Administered Medications Ordered in Other Encounters:     ondansetron (ZOFRAN) injection 4 mg, 4 mg, IntraVENous, Once, John D Weilbacker, DO    0.9 % sodium chloride bolus, 1,000 mL, IntraVENous, Once, Maximiliano Pyle Weilbacker, DO    Objective:    /68   Pulse 85   Temp 99 °F (37.2 °C) (Oral)   Resp 16   Ht 4' 9\" (1.448 m)   Wt 104 lb (47.2 kg)   SpO2 99%   BMI 22.51 kg/m²     Heart:  reg  Lungs:  ctab  Abd: + bs soft nontender  Extrem:  w/o edema    CBC with Differential:    Lab Results   Component Value Date/Time    WBC 4.6 11/18/2022 06:16 AM    RBC 4.21 11/18/2022 06:16 AM    HGB 12.5 11/18/2022 06:16 AM    HCT 37.3 11/18/2022 06:16 AM     11/18/2022 06:16 AM    MCV 88.6 11/18/2022 06:16 AM    MCH 29.7 11/18/2022 06:16 AM    MCHC 33.5 11/18/2022 06:16 AM    RDW 13.9 11/18/2022 06:16 AM    LYMPHOPCT 7.0 11/14/2022 02:59 PM    MONOPCT 7.5 11/14/2022 02:59 PM    BASOPCT 0.4 11/14/2022 02:59 PM    MONOSABS 0.70 11/14/2022 02:59 PM    LYMPHSABS 0.66 11/14/2022 02:59 PM    EOSABS 0.00 11/14/2022 02:59 PM    BASOSABS 0.04 11/14/2022 02:59 PM     CMP:    Lab Results   Component Value Date/Time     11/18/2022 06:16 AM    K 3.9 11/18/2022 06:16 AM    K 4.2 11/14/2022 02:59 PM     11/18/2022 06:16 AM    CO2 23 11/18/2022 06:16 AM    BUN 12 11/18/2022 06:16 AM    CREATININE 0.8 11/18/2022 06:16 AM    GFRAA 51 05/11/2017 09:00 AM    LABGLOM >60 11/18/2022 06:16 AM    GLUCOSE 100 11/18/2022 06:16 AM    PROT 6.6 11/18/2022 06:16 AM    LABALBU 3.4 11/18/2022 06:16 AM    CALCIUM 9.1 11/18/2022 06:16 AM    BILITOT 0.3 11/18/2022 06:16 AM    ALKPHOS 68 11/18/2022 06:16 AM    AST 37 11/18/2022 06:16 AM    ALT 22 11/18/2022 06:16 AM     Warfarin PT/INR:    Lab Results   Component Value Date    INR 1.2 09/23/2016    INR 2.6 03/22/2014    INR 2.9 03/21/2014    PROTIME 12.9 (H) 09/23/2016    PROTIME 19.5 (H) 03/22/2014    PROTIME 20.2 (H) 03/21/2014       Assessment:    Principal Problem:    Acute delirium  Active Problems:    Altered mental status  Resolved Problems:    * No resolved hospital problems.  *  Uti with E. coli    Plan:  Urine culture sensitivities noted  Patient is more confused currently  Increase activity as able          Vic Arzola MD  12:55 PM  11/19/2022

## 2022-11-20 PROCEDURE — 6360000002 HC RX W HCPCS: Performed by: INTERNAL MEDICINE

## 2022-11-20 PROCEDURE — 2580000003 HC RX 258: Performed by: INTERNAL MEDICINE

## 2022-11-20 PROCEDURE — 2060000000 HC ICU INTERMEDIATE R&B

## 2022-11-20 PROCEDURE — 6370000000 HC RX 637 (ALT 250 FOR IP): Performed by: PSYCHIATRY & NEUROLOGY

## 2022-11-20 PROCEDURE — 6370000000 HC RX 637 (ALT 250 FOR IP): Performed by: INTERNAL MEDICINE

## 2022-11-20 PROCEDURE — 2500000003 HC RX 250 WO HCPCS: Performed by: PSYCHIATRY & NEUROLOGY

## 2022-11-20 PROCEDURE — 2580000003 HC RX 258: Performed by: PSYCHIATRY & NEUROLOGY

## 2022-11-20 PROCEDURE — 6370000000 HC RX 637 (ALT 250 FOR IP): Performed by: NURSE PRACTITIONER

## 2022-11-20 RX ADMIN — Medication 10 ML: at 11:23

## 2022-11-20 RX ADMIN — MELATONIN 3 MG ORAL TABLET 3 MG: 3 TABLET ORAL at 20:48

## 2022-11-20 RX ADMIN — ACETAMINOPHEN 650 MG: 325 TABLET, FILM COATED ORAL at 03:25

## 2022-11-20 RX ADMIN — ACETAMINOPHEN 650 MG: 325 TABLET, FILM COATED ORAL at 17:46

## 2022-11-20 RX ADMIN — ENOXAPARIN SODIUM 30 MG: 100 INJECTION SUBCUTANEOUS at 11:23

## 2022-11-20 RX ADMIN — DEXTROSE MONOHYDRATE 125 MG: 50 INJECTION, SOLUTION INTRAVENOUS at 12:56

## 2022-11-20 RX ADMIN — DIVALPROEX SODIUM 125 MG: 125 CAPSULE ORAL at 20:48

## 2022-11-20 RX ADMIN — DIVALPROEX SODIUM 125 MG: 125 CAPSULE ORAL at 05:01

## 2022-11-20 RX ADMIN — Medication 10 ML: at 20:49

## 2022-11-20 RX ADMIN — CEFTRIAXONE 1000 MG: 1 INJECTION, POWDER, FOR SOLUTION INTRAMUSCULAR; INTRAVENOUS at 17:46

## 2022-11-20 ASSESSMENT — PAIN SCALES - PAIN ASSESSMENT IN ADVANCED DEMENTIA (PAINAD)
FACIALEXPRESSION: 0
CONSOLABILITY: 0
TOTALSCORE: 0
FACIALEXPRESSION: 0
NEGVOCALIZATION: 0
TOTALSCORE: 0
BODYLANGUAGE: 0
BREATHING: 0
BODYLANGUAGE: 0
NEGVOCALIZATION: 0
BREATHING: 0
CONSOLABILITY: 0

## 2022-11-20 ASSESSMENT — PAIN DESCRIPTION - LOCATION
LOCATION: BACK
LOCATION: BACK

## 2022-11-20 ASSESSMENT — PAIN SCALES - WONG BAKER
WONGBAKER_NUMERICALRESPONSE: 2
WONGBAKER_NUMERICALRESPONSE: 2

## 2022-11-20 ASSESSMENT — PAIN SCALES - GENERAL
PAINLEVEL_OUTOF10: 8
PAINLEVEL_OUTOF10: 7
PAINLEVEL_OUTOF10: 4

## 2022-11-20 ASSESSMENT — PAIN DESCRIPTION - ORIENTATION: ORIENTATION: RIGHT;LEFT

## 2022-11-20 ASSESSMENT — PAIN DESCRIPTION - DESCRIPTORS: DESCRIPTORS: ACHING

## 2022-11-20 NOTE — PROGRESS NOTES
[facility] Medicine    Subjective:   Awake, confused  No agitation          Current Facility-Administered Medications:     haloperidol lactate (HALDOL) injection 2 mg, 2 mg, IntraVENous, Q6H PRN, [place] alberto Covarrubias[place], DO, 2 mg at 11/18/22 0232    pantoprazole (PROTONIX) tablet 40 mg, 40 mg, Oral, QAM AC, Safia Peterson, DO, 40 mg at 11/19/22 9340    sodium chloride flush 0.9 % injection 5-40 mL, 5-40 mL, IntraVENous, 2 times per day, Kitty Maha, DO, 10 mL at 11/20/22 1123    sodium chloride flush 0.9 % injection 5-40 mL, 5-40 mL, IntraVENous, PRN, [place] al[place] Peterson, DO, 10 mL at 11/15/22 1748    0.9 % sodium chloride infusion, , IntraVENous, PRN, Safia Covarrubiasmer, DO    ondansetron (ZOFRAN-ODT) disintegrating tablet 4 mg, 4 mg, Oral, Q8H PRN **OR** ondansetron (ZOFRAN) injection 4 mg, 4 mg, IntraVENous, Q6H PRN, Safiajose alberto Arguetae Malmer, DO    polyethylene glycol (GLYCOLAX) packet 17 g, 17 g, Oral, Daily PRN, [place] Covarrubiasmer, DO    acetaminophen (TYLENOL) tablet 650 mg, 650 mg, Oral, Q6H PRN, 650 mg at 11/20/22 0325 **OR** acetaminophen (TYLENOL) suppository 650 mg, 650 mg, Rectal, Q6H PRN, [place] alberto Covarrubiasmer, DO    cefTRIAXone (ROCEPHIN) 1,000 mg in sterile water 10 mL IV syringe, 1,000 mg, IntraVENous, Q24H, Safia Peterson, DO, 1,000 mg at 11/19/22 1812    enoxaparin Sodium (LOVENOX) injection 30 mg, 30 mg, SubCUTAneous, Daily, Safia Peterson, DO, 30 mg at 11/20/22 1123    melatonin tablet 3 mg, 3 mg, Oral, Nightly, LAUREEN Santos CNP, 3 mg at 11/18/22 2049    divalproex (DEPAKOTE SPRINKLE) capsule 125 mg, 125 mg, Oral, Q8H, 125 mg at 11/20/22 0501 **OR** valproate (DEPACON) 125 mg in dextrose 5 % 100 mL IVPB, 125 mg, IntraVENous, Q8H, Cathryn Aquino DO, Last Rate: 100 mL/hr at 11/20/22 1256, 125 mg at 11/20/22 1256    Facility-Administered Medications Ordered in Other Encounters:     ondansetron (ZOFRAN) injection 4 mg, 4 mg, IntraVENous, Once, John D Weilbacker, DO    0.9 % sodium chloride bolus, 1,000 mL, IntraVENous, Once, Exie Space, DO    Objective:    /65   Pulse 94   Temp 98.6 °F (37 °C) (Temporal)   Resp 18   Ht 4' 9\" (1.448 m)   Wt 104 lb (47.2 kg)   SpO2 98%   BMI 22.51 kg/m²     Heart:  reg  Lungs:  ctab  Abd: + bs soft nontender  Extrem:  w/o edema    CBC with Differential:    Lab Results   Component Value Date/Time    WBC 4.6 11/18/2022 06:16 AM    RBC 4.21 11/18/2022 06:16 AM    HGB 12.5 11/18/2022 06:16 AM    HCT 37.3 11/18/2022 06:16 AM     11/18/2022 06:16 AM    MCV 88.6 11/18/2022 06:16 AM    MCH 29.7 11/18/2022 06:16 AM    MCHC 33.5 11/18/2022 06:16 AM    RDW 13.9 11/18/2022 06:16 AM    LYMPHOPCT 7.0 11/14/2022 02:59 PM    MONOPCT 7.5 11/14/2022 02:59 PM    BASOPCT 0.4 11/14/2022 02:59 PM    MONOSABS 0.70 11/14/2022 02:59 PM    LYMPHSABS 0.66 11/14/2022 02:59 PM    EOSABS 0.00 11/14/2022 02:59 PM    BASOSABS 0.04 11/14/2022 02:59 PM     CMP:    Lab Results   Component Value Date/Time     11/18/2022 06:16 AM    K 3.9 11/18/2022 06:16 AM    K 4.2 11/14/2022 02:59 PM     11/18/2022 06:16 AM    CO2 23 11/18/2022 06:16 AM    BUN 12 11/18/2022 06:16 AM    CREATININE 0.8 11/18/2022 06:16 AM    GFRAA 51 05/11/2017 09:00 AM    LABGLOM >60 11/18/2022 06:16 AM    GLUCOSE 100 11/18/2022 06:16 AM    PROT 6.6 11/18/2022 06:16 AM    LABALBU 3.4 11/18/2022 06:16 AM    CALCIUM 9.1 11/18/2022 06:16 AM    BILITOT 0.3 11/18/2022 06:16 AM    ALKPHOS 68 11/18/2022 06:16 AM    AST 37 11/18/2022 06:16 AM    ALT 22 11/18/2022 06:16 AM     Warfarin PT/INR:    Lab Results   Component Value Date    INR 1.2 09/23/2016    INR 2.6 03/22/2014    INR 2.9 03/21/2014    PROTIME 12.9 (H) 09/23/2016    PROTIME 19.5 (H) 03/22/2014    PROTIME 20.2 (H) 03/21/2014       Assessment:    Principal Problem:    Acute delirium  Active Problems:    Altered mental status  Resolved Problems:    * No resolved hospital problems.  *  Uti with E. coli    Plan:  Increase activity as able  Discharge Ismael Yun MD  1:06 PM  11/20/2022

## 2022-11-21 VITALS
BODY MASS INDEX: 22.44 KG/M2 | SYSTOLIC BLOOD PRESSURE: 135 MMHG | RESPIRATION RATE: 18 BRPM | WEIGHT: 104 LBS | HEART RATE: 71 BPM | DIASTOLIC BLOOD PRESSURE: 69 MMHG | TEMPERATURE: 97 F | HEIGHT: 57 IN | OXYGEN SATURATION: 96 %

## 2022-11-21 PROCEDURE — 6360000002 HC RX W HCPCS: Performed by: INTERNAL MEDICINE

## 2022-11-21 PROCEDURE — 6370000000 HC RX 637 (ALT 250 FOR IP): Performed by: PSYCHIATRY & NEUROLOGY

## 2022-11-21 PROCEDURE — 6370000000 HC RX 637 (ALT 250 FOR IP): Performed by: INTERNAL MEDICINE

## 2022-11-21 RX ORDER — LANOLIN ALCOHOL/MO/W.PET/CERES
3 CREAM (GRAM) TOPICAL NIGHTLY
Refills: 0 | COMMUNITY
Start: 2022-11-21 | End: 2022-11-21 | Stop reason: HOSPADM

## 2022-11-21 RX ORDER — CEFTRIAXONE 1 G/1
1000 INJECTION, POWDER, FOR SOLUTION INTRAMUSCULAR; INTRAVENOUS ONCE
Status: COMPLETED | OUTPATIENT
Start: 2022-11-21 | End: 2022-11-21

## 2022-11-21 RX ORDER — POLYETHYLENE GLYCOL 3350 17 G/17G
17 POWDER, FOR SOLUTION ORAL DAILY PRN
Qty: 527 G | Refills: 0 | COMMUNITY
Start: 2022-11-21 | End: 2022-11-21 | Stop reason: HOSPADM

## 2022-11-21 RX ORDER — ACETAMINOPHEN 325 MG/1
650 TABLET ORAL EVERY 6 HOURS PRN
Qty: 120 TABLET | Refills: 0 | COMMUNITY
Start: 2022-11-21

## 2022-11-21 RX ORDER — DIVALPROEX SODIUM 125 MG/1
125 CAPSULE, COATED PELLETS ORAL EVERY 8 HOURS
Qty: 90 CAPSULE | Refills: 0
Start: 2022-11-21 | End: 2022-11-21 | Stop reason: HOSPADM

## 2022-11-21 RX ADMIN — DIVALPROEX SODIUM 125 MG: 125 CAPSULE ORAL at 03:48

## 2022-11-21 RX ADMIN — DIVALPROEX SODIUM 125 MG: 125 CAPSULE ORAL at 12:19

## 2022-11-21 RX ADMIN — ENOXAPARIN SODIUM 30 MG: 100 INJECTION SUBCUTANEOUS at 10:03

## 2022-11-21 RX ADMIN — ACETAMINOPHEN 650 MG: 325 TABLET, FILM COATED ORAL at 00:40

## 2022-11-21 RX ADMIN — CEFTRIAXONE 1000 MG: 1 INJECTION, POWDER, FOR SOLUTION INTRAMUSCULAR; INTRAVENOUS at 12:34

## 2022-11-21 RX ADMIN — PANTOPRAZOLE SODIUM 40 MG: 40 TABLET, DELAYED RELEASE ORAL at 06:47

## 2022-11-21 ASSESSMENT — PAIN DESCRIPTION - DESCRIPTORS: DESCRIPTORS: ACHING

## 2022-11-21 ASSESSMENT — PAIN DESCRIPTION - LOCATION: LOCATION: BACK

## 2022-11-21 ASSESSMENT — PAIN DESCRIPTION - ORIENTATION: ORIENTATION: MID

## 2022-11-21 ASSESSMENT — PAIN SCALES - GENERAL: PAINLEVEL_OUTOF10: 7

## 2022-11-21 NOTE — CARE COORDINATION
SOCIAL WORK/CASEMANAGEMENT TRANSITION OF CARE PLANNINGMima Eva Starks, [address]):  [facility] is setup, I called them about discharge today. Need orders for nsg with PT and OT., left note in the chart for carlos Silvestre dme to deliver a w/c to the room by noon today. I met with daughter, zonia, this a.m. to answer any questions.   Kevin Waggoner, LIGIA  11/21/2022

## 2022-11-21 NOTE — PLAN OF CARE
Problem: Discharge Planning  Goal: Discharge to home or other facility with appropriate resources  11/20/2022 2254 by Pancho Arthur RN  Outcome: Progressing  11/20/2022 1321 by Elana Rios RN  Outcome: Progressing  Flowsheets (Taken 11/20/2022 1120)  Discharge to home or other facility with appropriate resources: Identify barriers to discharge with patient and caregiver     Problem: Safety - Adult  Goal: Free from fall injury  11/20/2022 2254 by Pancho Arthur RN  Outcome: Progressing  11/20/2022 1321 by Elana Rios RN  Outcome: Progressing     Problem: ABCDS Injury Assessment  Goal: Absence of physical injury  11/20/2022 2254 by Pancho Arthur RN  Outcome: Progressing  11/20/2022 1321 by Elana Rios RN  Outcome: Progressing     Problem: Skin/Tissue Integrity  Goal: Absence of new skin breakdown  11/20/2022 2254 by Pancho Arthur RN  Outcome: Progressing  11/20/2022 1321 by Elana Rios RN  Outcome: Progressing     Problem: Pain  Goal: Verbalizes/displays adequate comfort level or baseline comfort level  11/20/2022 2254 by Pancho Arthur RN  Outcome: Progressing  11/20/2022 1321 by Elana Rios RN  Outcome: Progressing

## 2022-11-21 NOTE — ACP (ADVANCE CARE PLANNING)
Advance Care Planning   Healthcare Decision Maker:    Primary Decision Maker: Jada Lan - [phone]    Click here to complete Healthcare Decision Makers including selection of the Healthcare Decision Maker Relationship (ie \"Primary\").

## 2022-11-21 NOTE — ADT AUTH CERT
Utilization Reviews       Urinary Tract Infection (UTI) - Care Day 7 (11/20/2022) by Getachew Tuttle RN       Review Status Review Entered   Completed 11/21/2022 1236       Created By   Getachew Tuttle RN      Criteria Review      Care Day: 7 Care Date: 11/20/2022 Level of Care: Intermediate Care    Guideline Day 2    Level Of Care    (X) Floor    11/21/2022 12:36 PM EST by Diego Liao      intermediate    Clinical Status    (X) * Hypotension absent    11/21/2022 12:36 PM EST by Diego Liao      /66    ( ) * Mental status at baseline    11/21/2022 12:36 PM EST by Diego Liao      confused, with no agitation    (X) * Afebrile or fever improved    11/21/2022 12:36 PM EST by Kingsley Huston 97.8    (X) * Vomiting absent or improved    11/21/2022 12:36 PM EST by Diego Liao      none noted    Activity    ( ) * Ambulatory    11/21/2022 12:36 PM EST by Diego Liao      up with assistance    Routes    (X) IV medications    11/21/2022 12:36 PM EST by Diego Liao      DEPACON 125 mg IVPB q8 IV    (X) Advance diet as tolerated    11/21/2022 12:36 PM EST by Diego Liao      regular    Interventions    (X) * Reversible urinary system abnormality (eg, obstruction, abscess) absent, addressed, or to be treated at next level of care    ( ) Renal function tests    11/21/2022 12:36 PM EST by Diego Liao      no labs report available    Medications    (X) Antibiotics    11/21/2022 12:36 PM EST by iDego Liao      ROCEPHIN 1,000 mg q24 IV    (X) Possible analgesics    11/21/2022 12:36 PM EST by Diego Liao      TYLENOL 650 mg q6 PRN PO x 2       Definitions for Care Day 7    Hypotension absent    (X) Hypotension absent, as indicated by  1 or more  of the following  (1) (2) (3) (4):       (X) SBP greater than or equal to 90 mm Hg and without recent decrease greater than 40 mm Hg from       baseline in adult or child 10 years or older       * Milestone   Additional Notes DATE: MILANA 11/20/22 D7            PERTINENT UPDATES:   Confused per assessment    Cont on iv rocephin,   on depacon q8 iv    PS 7,8 received prn tylenol x2         VITALS:   RR 16   AL 81   Spo2 96% RA            ABNL/PERTINENT LABS/RADIOLOGY/DIAGNOSTIC STUDIES:   no labs report available            PHYSICAL EXAM:   Awake, confused   No agitation   Abd: + bs soft nontender   Extrem:  w/o edema         MD CONSULTS/ASSESSMENT AND PLAN:   ##IM   Assessment:    Acute delirium    Altered mental status    Uti with E. coli       Plan:   Increase activity as able         MEDICATIONS:   DEPAKOTE SPRINKLE 125 mg q8 PO   melatonin 3 mg Nightly PO   LOVENOX 30 mg DAILY SC        Urinary Tract Infection (UTI) - Care Day 5 (11/18/2022) by Zoraida Jara RN       Review Status Review Entered   Completed 11/21/2022 1226       Created By   Zoraida Jara RN      Criteria Review      Care Day: 5 Care Date: 11/18/2022 Level of Care: Intermediate Care    Guideline Day 2    Level Of Care    (X) Floor    11/21/2022 12:26 PM EST by Mike Rodrigues      Intermediate Care    Clinical Status    (X) * Hypotension absent    11/21/2022 12:26 PM EST by Mike Rodrigues      /70    (X) * Mental status at baseline    11/21/2022 12:26 PM EST by Mike Rodrigues      alert conversive    (X) * Afebrile or fever improved    11/21/2022 12:26 PM EST by Parthenia Dilling 97.9    (X) * Vomiting absent or improved    11/21/2022 12:26 PM EST by Mike Rodrigues      none noted    Activity    ( ) * Ambulatory    11/21/2022 12:26 PM EST by Prasanth Lopez with assistance    Routes    (X) IV medications    11/21/2022 12:26 PM EST by Mike Rodrigues      HALDOL 2 mg q6 PRN IV x 1    (X) Advance diet as tolerated    11/21/2022 12:26 PM EST by Mike Rodrigues      regular    Interventions    (X) * Reversible urinary system abnormality (eg, obstruction, abscess) absent, addressed, or to be treated at next level of care    (X) WBC 11/21/2022 12:26 PM EST by Leonard Taylor      WBC: 4.6    (X) Renal function tests    11/21/2022 12:26 PM EST by Koki Archibald: Marii  Creatinine: 0.8    Medications    (X) Antibiotics    11/21/2022 12:26 PM EST by Leonard Taylor      ROCEPHIN 1,000 mg q24 IV    (X) Possible analgesics    11/21/2022 12:26 PM EST by Leonard Taylor      TYLENOL 650 mg q6 PRN PO x 2       Definitions for Care Day 5    Hypotension absent    (X) Hypotension absent, as indicated by  1 or more  of the following  (1) (2) (3) (4):       (X) SBP greater than or equal to 90 mm Hg and without recent decrease greater than 40 mm Hg from       baseline in adult or child 10 years or older       * Milestone   Additional Notes   DATE: MILANA 11/18/22             PERTINENT UPDATES:   Conversive today per consult received haldol iv x 1, Cont on abx rocephine iv as ordered         VITALS:   RR 16   WA 82   Spo2 92% RA   PS 3         ABNL/PERTINENT LABS/RADIOLOGY/DIAGNOSTIC STUDIES:   Glucose, Random: 100 (H)   Albumin: 3.4 (L)   AST: 37 (H)      Hemoglobin Quant: 12.5   Hematocrit: 37.3         PHYSICAL EXAM:   alert conversive   Abd: + bs soft nontender   Extrem:  w/o edema         MD CONSULTS/ASSESSMENT AND PLAN:   ##IM      Assessment:     Acute delirium     Altered mental status     uti       Plan:   Cont atb cont pt /o dc planning to rehab            MEDICATIONS:   DEPAKOTE SPRINKLE 125 mg q8 PO   melatonin 3 mg Nightly PO   LOVENOX 30 mg DAILY SC         ORDERS:   Assess skin per unit guidelines   Pulse Oximetry Spot Check

## 2022-11-21 NOTE — DISCHARGE INSTR - COC
Continuity of Care Form    Patient Name: Juan Mendez   :  1929  MRN:  [id number]    Admit date:  2022  Discharge date:  ***    Code Status Order: DNR-CC   Advance Directives:     Admitting Physician:  Kelsy Morgan DO  PCP: Chavez Parra MD    Discharging Nurse: [facility] Unit/Room#: 8505/8505-B  Discharging Unit Phone Number: ***    Emergency Contact:   Extended Emergency Contact Information  Primary Emergency Contact: Juliet Gilman  Address: [address] Phone: [phone]  Mobile Phone: [phone]  Relation: Child  Secondary Emergency Contact: Juliet Gilman  Address: [address] Phone: [phone]  Mobile Phone: [phone]  Relation: Brother/Sister    Past Surgical History:  Past Surgical History:   Procedure Laterality Date    CATARACT REMOVAL WITH IMPLANT Bilateral     ENDOSCOPY, COLON, DIAGNOSTIC      ENDOSCOPY, COLON, DIAGNOSTIC  2016    anemia,gi bleed       Immunization History:   Immunization History   Administered Date(s) Administered    Tdap (Boostrix, Adacel) 2015       Active Problems:  Patient Active Problem List   Diagnosis Code    Osteoarthritis of left knee M17.12    HTN (hypertension), benign I10    Mixed hyperlipidemia E78.2    Mass of pancreas K86.89    Shoulder dislocation S43.006A    Acute delirium R41.0    Altered mental status R41.82       Isolation/Infection:   Isolation            No Isolation          Patient Infection Status       None to display            Nurse Assessment:  Last Vital Signs: /66   Pulse 81   Temp 97.8 °F (36.6 °C) (Temporal)   Resp 16   Ht 4' 9\" (1.448 m)   Wt 104 lb (47.2 kg)   SpO2 96%   BMI 22.51 kg/m²     Last documented pain score (0-10 scale): Pain Level: 7  Last Weight:   Wt Readings from Last 1 Encounters:   11/15/22 104 lb (47.2 kg)     Mental Status:  {IP PT MENTAL STATUS:98324}    IV Access:  { TAHMINA IV ACCESS:929370838}    Nursing Mobility/ADLs:  Walking   {CHP DME FPIJ:622820682}  Transfer  {CHP DME WWJX:611808843}  Bathing  {CHP DME MCFD:239072107}  Dressing  {CHP DME KKXP:140903422}  Toileting  {CHP DME QIWJ:040385893}  Feeding  {P DME OXYE:484903911}  Med Admin  {CHP DME RBPW:175378518}  Med Delivery   { TAHMINA MED Delivery:248543530}    Wound Care Documentation and Therapy:  Wound 06/05/15 Laceration Hand Left 1 inch laceration noted with surrounding hematoma (Active)   Number of days: 6504       Incision 14 Leg Left (Active)   Number of days: 3168        Elimination:  Continence: Bowel: {YES / DN:48839}  Bladder: {YES / F}  Urinary Catheter: {Urinary Catheter:970408933}   Colostomy/Ileostomy/Ileal Conduit: {YES / WS:49486}       Date of Last BM: ***    Intake/Output Summary (Last 24 hours) at 2022 0713  Last data filed at 2022 1123  Gross per 24 hour   Intake 10 ml   Output --   Net 10 ml     I/O last 3 completed shifts:   In: 10 [I.V.:10]  Out: -     Safety Concerns:     [address] Safety Concerns:797861090}    Impairments/Disabilities:      [address] [employer] Impairments/Disabilities:672365124}    Nutrition Therapy:  Current Nutrition Therapy:   [address] [employer] Diet List:012373245}    Routes of Feeding: {CHP DME Other Feedings:716665003}  Liquids: {Slp liquid thickness:90999}  Daily Fluid Restriction: {CHP DME Yes amt example:316261900}  Last Modified Barium Swallow with Video (Video Swallowing Test): {Done Not Done CWMQ:068673964}    Treatments at the Time of Hospital Discharge:   Respiratory Treatments: ***  Oxygen Therapy:  {Therapy; copd oxygen:05903}  Ventilator:    {[facility] Vent OHQP:219639240}    Rehab Therapies: {THERAPEUTIC INTERVENTION:6598539547}  Weight Bearing Status/Restrictions: [address] MALHOTRA Weight Bearin}  Other Medical Equipment (for information only, NOT a DME order):  {EQUIPMENT:202798544}  Other Treatments: ***    Patient's personal belongings (please select all that are sent with patient):  {CHP DME Belongings:589302743}    RN SIGNATURE:  {Esignature:870552734}    CASE MANAGEMENT/SOCIAL WORK SECTION    Inpatient Status Date: ***    Readmission Risk Assessment Score:  Readmission Risk              Risk of Unplanned Readmission:  11           Discharging to Facility/ Agency   Name:   Address:  Phone:  Fax:    Dialysis Facility (if applicable)   Name:  Address:  Dialysis Schedule:  Phone:  Fax:    / signature: {Esignature:152571130}    PHYSICIAN SECTION    Prognosis: {Prognosis:5972072390}    Condition at Discharge: [address] [place] [address] Patient Condition:390495894}    Rehab Potential (if transferring to Rehab): {Prognosis:0031708764}    Recommended Labs or Other Treatments After Discharge: ***    Physician Certification: I certify the above information and transfer of Jia Byrd  is necessary for the continuing treatment of the diagnosis listed and that she requires {Admit to Appropriate Level of Care:35015} for {GREATER/LESS:836148390} 30 days.      Update Admission H&P: {CHP DME Changes in GRXJQ:194437259}    PHYSICIAN SIGNATURE:  {Esignature:227596685}Electronically signed by Vega Thrasher DO on 11/21/2022 at 7:14 AM

## 2022-11-21 NOTE — PROGRESS NOTES
[facility] Medicine    Subjective:  pt alert conversive cooperative      Current Facility-Administered Medications:     Benzocaine-Menthol (CEPACOL SORE THROAT) 15-2.6 MG lozenge 1 lozenge, 1 lozenge, Oral, Q2H PRN, Álvaro Clarke MD    haloperidol lactate (HALDOL) injection 2 mg, 2 mg, IntraVENous, Q6H PRN, Orpah Kays Malmer, DO, 2 mg at 11/18/22 0232    pantoprazole (PROTONIX) tablet 40 mg, 40 mg, Oral, QAM AC, Orpah Kays Malmer, DO, 40 mg at 11/21/22 8035    sodium chloride flush 0.9 % injection 5-40 mL, 5-40 mL, IntraVENous, 2 times per day, Balbina Sirisha, DO, 10 mL at 11/20/22 2049    sodium chloride flush 0.9 % injection 5-40 mL, 5-40 mL, IntraVENous, PRN, Orpah Kays Malmer, DO, 10 mL at 11/15/22 1748    0.9 % sodium chloride infusion, , IntraVENous, PRN, Orpah Kays Malmer, DO    ondansetron (ZOFRAN-ODT) disintegrating tablet 4 mg, 4 mg, Oral, Q8H PRN **OR** ondansetron (ZOFRAN) injection 4 mg, 4 mg, IntraVENous, Q6H PRN, Orpah Kays Malmer, DO    polyethylene glycol (GLYCOLAX) packet 17 g, 17 g, Oral, Daily PRN, Orpah Kays Malmer, DO    acetaminophen (TYLENOL) tablet 650 mg, 650 mg, Oral, Q6H PRN, 650 mg at 11/21/22 0040 **OR** acetaminophen (TYLENOL) suppository 650 mg, 650 mg, Rectal, Q6H PRN, Orpah Kays Malmer, DO    cefTRIAXone (ROCEPHIN) 1,000 mg in sterile water 10 mL IV syringe, 1,000 mg, IntraVENous, Q24H, Orpah Kays Malmer, DO, 1,000 mg at 11/20/22 1746    enoxaparin Sodium (LOVENOX) injection 30 mg, 30 mg, SubCUTAneous, Daily, Orpah Kays Malmer, DO, 30 mg at 11/20/22 1123    melatonin tablet 3 mg, 3 mg, Oral, Nightly, John Patel, LAUREEN - CNP, 3 mg at 11/20/22 2048    divalproex (DEPAKOTE SPRINKLE) capsule 125 mg, 125 mg, Oral, Q8H, 125 mg at 11/21/22 0348 **OR** valproate (DEPACON) 125 mg in dextrose 5 % 100 mL IVPB, 125 mg, IntraVENous, Q8H, Kimberly Nunez DO, Stopped at 11/20/22 1356    Facility-Administered Medications Ordered in Other Encounters:     ondansetron (ZOFRAN) injection 4 mg, 4 mg, IntraVENous, Once, John D Weilbacker, DO    0.9 % sodium chloride bolus, 1,000 mL, IntraVENous, Once, John D Weilbacker, DO    Objective:    /66   Pulse 81   Temp 97.8 °F (36.6 °C) (Temporal)   Resp 16   Ht 4' 9\" (1.448 m)   Wt 104 lb (47.2 kg)   SpO2 96%   BMI 22.51 kg/m²     Heart:  reg  Lungs:  ctab  Abd: + bs soft nontender  Extrem:  w/o edema    CBC with Differential:    Lab Results   Component Value Date/Time    WBC 4.6 11/18/2022 06:16 AM    RBC 4.21 11/18/2022 06:16 AM    HGB 12.5 11/18/2022 06:16 AM    HCT 37.3 11/18/2022 06:16 AM     11/18/2022 06:16 AM    MCV 88.6 11/18/2022 06:16 AM    MCH 29.7 11/18/2022 06:16 AM    MCHC 33.5 11/18/2022 06:16 AM    RDW 13.9 11/18/2022 06:16 AM    LYMPHOPCT 7.0 11/14/2022 02:59 PM    MONOPCT 7.5 11/14/2022 02:59 PM    BASOPCT 0.4 11/14/2022 02:59 PM    MONOSABS 0.70 11/14/2022 02:59 PM    LYMPHSABS 0.66 11/14/2022 02:59 PM    EOSABS 0.00 11/14/2022 02:59 PM    BASOSABS 0.04 11/14/2022 02:59 PM     CMP:    Lab Results   Component Value Date/Time     11/18/2022 06:16 AM    K 3.9 11/18/2022 06:16 AM    K 4.2 11/14/2022 02:59 PM     11/18/2022 06:16 AM    CO2 23 11/18/2022 06:16 AM    BUN 12 11/18/2022 06:16 AM    CREATININE 0.8 11/18/2022 06:16 AM    GFRAA 51 05/11/2017 09:00 AM    LABGLOM >60 11/18/2022 06:16 AM    GLUCOSE 100 11/18/2022 06:16 AM    PROT 6.6 11/18/2022 06:16 AM    LABALBU 3.4 11/18/2022 06:16 AM    CALCIUM 9.1 11/18/2022 06:16 AM    BILITOT 0.3 11/18/2022 06:16 AM    ALKPHOS 68 11/18/2022 06:16 AM    AST 37 11/18/2022 06:16 AM    ALT 22 11/18/2022 06:16 AM     Warfarin PT/INR:    Lab Results   Component Value Date    INR 1.2 09/23/2016    INR 2.6 03/22/2014    INR 2.9 03/21/2014    PROTIME 12.9 (H) 09/23/2016    PROTIME 19.5 (H) 03/22/2014    PROTIME 20.2 (H) 03/21/2014       Assessment:    Principal Problem:    Acute delirium  Active Problems:    Altered mental status  Resolved Problems:    * No resolved hospital problems.  *  uti    Plan:  Dc planning cont ty Zhou DO  7:10 AM  11/21/2022

## 2022-12-21 NOTE — DISCHARGE SUMMARY
[address]                  [address] [facility],  [facility]                               DISCHARGE SUMMARY    PATIENT NAME: Armando Wilson                      :        1929  MED REC NO:   [id number]                            ROOM:       8505  ACCOUNT NO:   [de-identified]                           ADMIT DATE: 2022  PROVIDER:     Emani Petty DO                  DISCHARGE DATE:  2022    DISCHARGE DIAGNOSES:  1. Acute delirium. 2.  UTI. 3.  Osteoarthritis. HOSPITAL COURSE:  The patient is a 59-year-old  female who  presented to the hospital with altered mental status. Diagnostic  evaluation in the emergency room revealed a UTI. The patient admitted  to the hospital, treated with antibiotics. She was seen by Neurology. Her status improved and she was eventually discharged to home in stable  condition on 2022. DISCHARGE MEDICATIONS:  As per discharge med rec which include:  1. Tylenol p.r.n.  2.  Aspirin. 3.  Protonix. The patient instructed to follow up with Dr. Evelio Cummings, call office  for appointment.         Javier Yan DO    D: 2022 14:21:03       T: 2022 14:23:27     [email or web address]  Job#: [id number]     Doc#: [id number]    CC:

## 2023-04-29 ENCOUNTER — APPOINTMENT (OUTPATIENT)
Dept: CT IMAGING | Age: 88
End: 2023-04-29
Payer: MEDICARE

## 2023-04-29 ENCOUNTER — APPOINTMENT (OUTPATIENT)
Dept: GENERAL RADIOLOGY | Age: 88
End: 2023-04-29
Payer: MEDICARE

## 2023-04-29 ENCOUNTER — HOSPITAL ENCOUNTER (EMERGENCY)
Age: 88
Discharge: HOME | End: 2023-04-29
Attending: EMERGENCY MEDICINE
Payer: MEDICARE

## 2023-04-29 VITALS
TEMPERATURE: 97.4 F | DIASTOLIC BLOOD PRESSURE: 89 MMHG | HEIGHT: 57 IN | RESPIRATION RATE: 18 BRPM | SYSTOLIC BLOOD PRESSURE: 159 MMHG | BODY MASS INDEX: 22.51 KG/M2 | HEART RATE: 75 BPM | OXYGEN SATURATION: 95 %

## 2023-04-29 DIAGNOSIS — R41.82 ALTERED MENTAL STATUS, UNSPECIFIED ALTERED MENTAL STATUS TYPE: Primary | ICD-10-CM

## 2023-04-29 LAB
ALBUMIN SERPL-MCNC: 4.3 G/DL (ref 3.5–5.2)
ALP SERPL-CCNC: 92 U/L (ref 35–104)
ALT SERPL-CCNC: 15 U/L (ref 0–32)
ANION GAP SERPL CALCULATED.3IONS-SCNC: 12 MMOL/L (ref 7–16)
APTT BLD: 44.7 SEC (ref 24.5–35.1)
AST SERPL-CCNC: 36 U/L (ref 0–31)
BACTERIA URNS QL MICRO: ABNORMAL /HPF
BASOPHILS # BLD: 0.04 E9/L (ref 0–0.2)
BASOPHILS NFR BLD: 0.7 % (ref 0–2)
BILIRUB SERPL-MCNC: 0.3 MG/DL (ref 0–1.2)
BILIRUB UR QL STRIP: NEGATIVE
BUN SERPL-MCNC: 14 MG/DL (ref 6–23)
CALCIUM SERPL-MCNC: 9.9 MG/DL (ref 8.6–10.2)
CHLORIDE SERPL-SCNC: 93 MMOL/L (ref 98–107)
CLARITY UR: CLEAR
CO2 SERPL-SCNC: 25 MMOL/L (ref 22–29)
COLOR UR: YELLOW
CREAT SERPL-MCNC: 0.9 MG/DL (ref 0.5–1)
EKG ATRIAL RATE: 78 BPM
EKG P AXIS: 44 DEGREES
EKG P-R INTERVAL: 172 MS
EKG Q-T INTERVAL: 394 MS
EKG QRS DURATION: 88 MS
EKG QTC CALCULATION (BAZETT): 449 MS
EKG R AXIS: -10 DEGREES
EKG T AXIS: 20 DEGREES
EKG VENTRICULAR RATE: 78 BPM
EOSINOPHIL # BLD: 0.09 E9/L (ref 0.05–0.5)
EOSINOPHIL NFR BLD: 1.7 % (ref 0–6)
EPI CELLS #/AREA URNS HPF: ABNORMAL /HPF
ERYTHROCYTE [DISTWIDTH] IN BLOOD BY AUTOMATED COUNT: 13.2 FL (ref 11.5–15)
GLUCOSE SERPL-MCNC: 91 MG/DL (ref 74–99)
GLUCOSE UR STRIP-MCNC: NEGATIVE MG/DL
HCT VFR BLD AUTO: 44.1 % (ref 34–48)
HGB BLD-MCNC: 14.2 G/DL (ref 11.5–15.5)
HGB UR QL STRIP: NEGATIVE
IMM GRANULOCYTES # BLD: 0.01 E9/L
IMM GRANULOCYTES NFR BLD: 0.2 % (ref 0–5)
INR BLD: 1.2
KETONES UR STRIP-MCNC: NEGATIVE MG/DL
LEUKOCYTE ESTERASE UR QL STRIP: ABNORMAL
LYMPHOCYTES # BLD: 1.02 E9/L (ref 1.5–4)
LYMPHOCYTES NFR BLD: 18.9 % (ref 20–42)
MCH RBC QN AUTO: 29.3 PG (ref 26–35)
MCHC RBC AUTO-ENTMCNC: 32.2 % (ref 32–34.5)
MCV RBC AUTO: 91.1 FL (ref 80–99.9)
MONOCYTES # BLD: 0.68 E9/L (ref 0.1–0.95)
MONOCYTES NFR BLD: 12.6 % (ref 2–12)
NEUTROPHILS # BLD: 3.56 E9/L (ref 1.8–7.3)
NEUTS SEG NFR BLD: 65.9 % (ref 43–80)
NITRITE UR QL STRIP: NEGATIVE
PH UR STRIP: 7.5 [PH] (ref 5–9)
PLATELET # BLD AUTO: 291 E9/L (ref 130–450)
PMV BLD AUTO: 9.3 FL (ref 7–12)
POTASSIUM SERPL-SCNC: 4.9 MMOL/L (ref 3.5–5)
PROT SERPL-MCNC: 8 G/DL (ref 6.4–8.3)
PROT UR STRIP-MCNC: NEGATIVE MG/DL
PROTHROMBIN TIME: 13 SEC (ref 9.3–12.4)
RBC # BLD AUTO: 4.84 E12/L (ref 3.5–5.5)
RBC #/AREA URNS HPF: ABNORMAL /HPF (ref 0–2)
REASON FOR REJECTION: NORMAL
REJECTED TEST: NORMAL
SODIUM SERPL-SCNC: 130 MMOL/L (ref 132–146)
SP GR UR STRIP: <=1.005 (ref 1–1.03)
TROPONIN, HIGH SENSITIVITY: 16 NG/L (ref 0–9)
UROBILINOGEN UR STRIP-ACNC: 0.2 E.U./DL
WBC # BLD: 5.4 E9/L (ref 4.5–11.5)
WBC #/AREA URNS HPF: ABNORMAL /HPF (ref 0–5)

## 2023-04-29 PROCEDURE — 85610 PROTHROMBIN TIME: CPT

## 2023-04-29 PROCEDURE — 93005 ELECTROCARDIOGRAM TRACING: CPT | Performed by: EMERGENCY MEDICINE

## 2023-04-29 PROCEDURE — 93010 ELECTROCARDIOGRAM REPORT: CPT | Performed by: INTERNAL MEDICINE

## 2023-04-29 PROCEDURE — 99285 EMERGENCY DEPT VISIT HI MDM: CPT

## 2023-04-29 PROCEDURE — 87088 URINE BACTERIA CULTURE: CPT

## 2023-04-29 PROCEDURE — 84484 ASSAY OF TROPONIN QUANT: CPT

## 2023-04-29 PROCEDURE — 6370000000 HC RX 637 (ALT 250 FOR IP): Performed by: EMERGENCY MEDICINE

## 2023-04-29 PROCEDURE — 71045 X-RAY EXAM CHEST 1 VIEW: CPT

## 2023-04-29 PROCEDURE — 81001 URINALYSIS AUTO W/SCOPE: CPT

## 2023-04-29 PROCEDURE — 85025 COMPLETE CBC W/AUTO DIFF WBC: CPT

## 2023-04-29 PROCEDURE — 80053 COMPREHEN METABOLIC PANEL: CPT

## 2023-04-29 PROCEDURE — 85730 THROMBOPLASTIN TIME PARTIAL: CPT

## 2023-04-29 PROCEDURE — 70450 CT HEAD/BRAIN W/O DYE: CPT

## 2023-04-29 RX ORDER — GRANULES FOR ORAL 3 G/1
3 POWDER ORAL ONCE
Status: COMPLETED | OUTPATIENT
Start: 2023-04-29 | End: 2023-04-29

## 2023-04-29 RX ADMIN — GRANULES FOR ORAL SOLUTION 1 PACKET: 3 POWDER ORAL at 17:44

## 2023-04-29 NOTE — ED NOTES
Family asking to leave before repeat troponin result. Daughter states, Antolin Hawkins are not going to wait for the results. \" Dr. Heriberto Ryder notified     Melissa Amos RN  04/29/23 1711

## 2023-04-29 NOTE — ED PROVIDER NOTES
Eosinophils % 1.7 0.0 - 6.0 %    Basophils % 0.7 0.0 - 2.0 %    Neutrophils Absolute 3.56 1.80 - 7.30 E9/L    Immature Granulocytes # 0.01 E9/L    Lymphocytes Absolute 1.02 (L) 1.50 - 4.00 E9/L    Monocytes Absolute 0.68 0.10 - 0.95 E9/L    Eosinophils Absolute 0.09 0.05 - 0.50 E9/L    Basophils Absolute 0.04 0.00 - 0.20 E9/L   Comprehensive Metabolic Panel   Result Value Ref Range    Sodium 130 (L) 132 - 146 mmol/L    Potassium 4.9 3.5 - 5.0 mmol/L    Chloride 93 (L) 98 - 107 mmol/L    CO2 25 22 - 29 mmol/L    Anion Gap 12 7 - 16 mmol/L    Glucose 91 74 - 99 mg/dL    BUN 14 6 - 23 mg/dL    Creatinine 0.9 0.5 - 1.0 mg/dL    Est, Glom Filt Rate 59 >=60 mL/min/1.73    Calcium 9.9 8.6 - 10.2 mg/dL    Total Protein 8.0 6.4 - 8.3 g/dL    Albumin 4.3 3.5 - 5.2 g/dL    Total Bilirubin 0.3 0.0 - 1.2 mg/dL    Alkaline Phosphatase 92 35 - 104 U/L    ALT 15 0 - 32 U/L    AST 36 (H) 0 - 31 U/L   Protime-INR   Result Value Ref Range    Protime 13.0 (H) 9.3 - 12.4 sec    INR 1.2    APTT   Result Value Ref Range    aPTT 44.7 (H) 24.5 - 35.1 sec   Urinalysis   Result Value Ref Range    Color, UA Yellow Straw/Yellow    Clarity, UA Clear Clear    Glucose, Ur Negative Negative mg/dL    Bilirubin Urine Negative Negative    Ketones, Urine Negative Negative mg/dL    Specific Gravity, UA <=1.005 1.005 - 1.030    Blood, Urine Negative Negative    pH, UA 7.5 5.0 - 9.0    Protein, UA Negative Negative mg/dL    Urobilinogen, Urine 0.2 <2.0 E.U./dL    Nitrite, Urine Negative Negative    Leukocyte Esterase, Urine MODERATE (A) Negative   Microscopic Urinalysis   Result Value Ref Range    WBC, UA 5-10 (A) 0 - 5 /HPF    RBC, UA NONE 0 - 2 /HPF    Epithelial Cells, UA MANY /HPF    Bacteria, UA FEW (A) None Seen /HPF   SPECIMEN REJECTION   Result Value Ref Range    Rejected Test TRP     Reason for Rejection see below    Troponin   Result Value Ref Range    Troponin, High Sensitivity 16 (H) 0 - 9 ng/L   EKG 12 Lead   Result Value Ref Range

## 2023-04-30 LAB — BACTERIA UR CULT: NORMAL

## 2023-05-01 LAB — BACTERIA UR CULT: NORMAL

## 2023-05-24 ENCOUNTER — APPOINTMENT (OUTPATIENT)
Dept: CT IMAGING | Age: 88
End: 2023-05-24
Attending: EMERGENCY MEDICINE
Payer: MEDICARE

## 2023-05-24 ENCOUNTER — HOSPITAL ENCOUNTER (EMERGENCY)
Age: 88
Discharge: HOME | End: 2023-05-24
Attending: EMERGENCY MEDICINE
Payer: MEDICARE

## 2023-05-24 ENCOUNTER — APPOINTMENT (OUTPATIENT)
Dept: GENERAL RADIOLOGY | Age: 88
End: 2023-05-24
Payer: MEDICARE

## 2023-05-24 VITALS
BODY MASS INDEX: 22.72 KG/M2 | OXYGEN SATURATION: 97 % | SYSTOLIC BLOOD PRESSURE: 179 MMHG | WEIGHT: 105 LBS | HEART RATE: 81 BPM | TEMPERATURE: 98 F | DIASTOLIC BLOOD PRESSURE: 99 MMHG | RESPIRATION RATE: 16 BRPM

## 2023-05-24 DIAGNOSIS — R41.82 ALTERED MENTAL STATUS, UNSPECIFIED ALTERED MENTAL STATUS TYPE: Primary | ICD-10-CM

## 2023-05-24 DIAGNOSIS — K59.00 CONSTIPATION, UNSPECIFIED CONSTIPATION TYPE: ICD-10-CM

## 2023-05-24 LAB
ALBUMIN SERPL-MCNC: 4.2 G/DL (ref 3.5–5.2)
ALP SERPL-CCNC: 86 U/L (ref 35–104)
ALT SERPL-CCNC: 11 U/L (ref 0–32)
ANION GAP SERPL CALCULATED.3IONS-SCNC: 12 MMOL/L (ref 7–16)
AST SERPL-CCNC: 29 U/L (ref 0–31)
BACTERIA URNS QL MICRO: ABNORMAL /HPF
BASOPHILS # BLD: 0.03 E9/L (ref 0–0.2)
BASOPHILS NFR BLD: 0.6 % (ref 0–2)
BILIRUB SERPL-MCNC: 0.4 MG/DL (ref 0–1.2)
BILIRUB UR QL STRIP: NEGATIVE
BUN SERPL-MCNC: 12 MG/DL (ref 6–23)
CALCIUM SERPL-MCNC: 9.9 MG/DL (ref 8.6–10.2)
CHLORIDE SERPL-SCNC: 96 MMOL/L (ref 98–107)
CLARITY UR: CLEAR
CO2 SERPL-SCNC: 26 MMOL/L (ref 22–29)
COLOR UR: YELLOW
CREAT SERPL-MCNC: 0.9 MG/DL (ref 0.5–1)
EOSINOPHIL # BLD: 0.06 E9/L (ref 0.05–0.5)
EOSINOPHIL NFR BLD: 1.2 % (ref 0–6)
EPI CELLS #/AREA URNS HPF: ABNORMAL /HPF
ERYTHROCYTE [DISTWIDTH] IN BLOOD BY AUTOMATED COUNT: 13.5 FL (ref 11.5–15)
GLUCOSE SERPL-MCNC: 119 MG/DL (ref 74–99)
GLUCOSE UR STRIP-MCNC: NEGATIVE MG/DL
HCT VFR BLD AUTO: 44.5 % (ref 34–48)
HGB BLD-MCNC: 14.3 G/DL (ref 11.5–15.5)
HGB UR QL STRIP: NEGATIVE
IMM GRANULOCYTES # BLD: 0.01 E9/L
IMM GRANULOCYTES NFR BLD: 0.2 % (ref 0–5)
KETONES UR STRIP-MCNC: NEGATIVE MG/DL
LEUKOCYTE ESTERASE UR QL STRIP: ABNORMAL
LYMPHOCYTES # BLD: 1.1 E9/L (ref 1.5–4)
LYMPHOCYTES NFR BLD: 22.6 % (ref 20–42)
MCH RBC QN AUTO: 29.1 PG (ref 26–35)
MCHC RBC AUTO-ENTMCNC: 32.1 % (ref 32–34.5)
MCV RBC AUTO: 90.6 FL (ref 80–99.9)
MONOCYTES # BLD: 0.55 E9/L (ref 0.1–0.95)
MONOCYTES NFR BLD: 11.3 % (ref 2–12)
NEUTROPHILS # BLD: 3.12 E9/L (ref 1.8–7.3)
NEUTS SEG NFR BLD: 64.1 % (ref 43–80)
NITRITE UR QL STRIP: NEGATIVE
PH UR STRIP: 7 [PH] (ref 5–9)
PLATELET # BLD AUTO: 286 E9/L (ref 130–450)
PMV BLD AUTO: 9.7 FL (ref 7–12)
POTASSIUM SERPL-SCNC: 4.5 MMOL/L (ref 3.5–5)
PROT SERPL-MCNC: 7.5 G/DL (ref 6.4–8.3)
PROT UR STRIP-MCNC: NEGATIVE MG/DL
RBC # BLD AUTO: 4.91 E12/L (ref 3.5–5.5)
RBC #/AREA URNS HPF: ABNORMAL /HPF (ref 0–2)
SODIUM SERPL-SCNC: 134 MMOL/L (ref 132–146)
SP GR UR STRIP: <=1.005 (ref 1–1.03)
UROBILINOGEN UR STRIP-ACNC: 0.2 E.U./DL
WBC # BLD: 4.9 E9/L (ref 4.5–11.5)
WBC #/AREA URNS HPF: ABNORMAL /HPF (ref 0–5)

## 2023-05-24 PROCEDURE — 2580000003 HC RX 258: Performed by: EMERGENCY MEDICINE

## 2023-05-24 PROCEDURE — 87088 URINE BACTERIA CULTURE: CPT

## 2023-05-24 PROCEDURE — 96361 HYDRATE IV INFUSION ADD-ON: CPT

## 2023-05-24 PROCEDURE — 71045 X-RAY EXAM CHEST 1 VIEW: CPT

## 2023-05-24 PROCEDURE — 74177 CT ABD & PELVIS W/CONTRAST: CPT

## 2023-05-24 PROCEDURE — 6360000004 HC RX CONTRAST MEDICATION: Performed by: RADIOLOGY

## 2023-05-24 PROCEDURE — 99285 EMERGENCY DEPT VISIT HI MDM: CPT

## 2023-05-24 PROCEDURE — 96360 HYDRATION IV INFUSION INIT: CPT

## 2023-05-24 PROCEDURE — 85025 COMPLETE CBC W/AUTO DIFF WBC: CPT

## 2023-05-24 PROCEDURE — 80053 COMPREHEN METABOLIC PANEL: CPT

## 2023-05-24 PROCEDURE — 81001 URINALYSIS AUTO W/SCOPE: CPT

## 2023-05-24 RX ORDER — POLYETHYLENE GLYCOL 3350 17 G/17G
17 POWDER, FOR SOLUTION ORAL DAILY
Qty: 510 G | Refills: 0 | Status: SHIPPED | OUTPATIENT
Start: 2023-05-24 | End: 2023-06-23

## 2023-05-24 RX ORDER — PANTOPRAZOLE SODIUM 40 MG/10ML
40 INJECTION, POWDER, LYOPHILIZED, FOR SOLUTION INTRAVENOUS ONCE
Status: DISCONTINUED | OUTPATIENT
Start: 2023-05-24 | End: 2023-05-24 | Stop reason: HOSPADM

## 2023-05-24 RX ORDER — PANTOPRAZOLE SODIUM 40 MG/1
40 TABLET, DELAYED RELEASE ORAL 2 TIMES DAILY
Qty: 30 TABLET | Refills: 0 | Status: SHIPPED | OUTPATIENT
Start: 2023-05-24

## 2023-05-24 RX ORDER — 0.9 % SODIUM CHLORIDE 0.9 %
1000 INTRAVENOUS SOLUTION INTRAVENOUS ONCE
Status: COMPLETED | OUTPATIENT
Start: 2023-05-24 | End: 2023-05-24

## 2023-05-24 RX ADMIN — IOPAMIDOL 75 ML: 755 INJECTION, SOLUTION INTRAVENOUS at 14:44

## 2023-05-24 RX ADMIN — SODIUM CHLORIDE 1000 ML: 9 INJECTION, SOLUTION INTRAVENOUS at 10:29

## 2023-05-24 ASSESSMENT — PAIN DESCRIPTION - ORIENTATION: ORIENTATION: RIGHT;LEFT

## 2023-05-24 ASSESSMENT — LIFESTYLE VARIABLES: HOW OFTEN DO YOU HAVE A DRINK CONTAINING ALCOHOL: NEVER

## 2023-05-24 ASSESSMENT — PAIN DESCRIPTION - PAIN TYPE: TYPE: CHRONIC PAIN

## 2023-05-24 ASSESSMENT — PAIN DESCRIPTION - FREQUENCY: FREQUENCY: CONTINUOUS

## 2023-05-24 ASSESSMENT — PAIN DESCRIPTION - DESCRIPTORS: DESCRIPTORS: ACHING

## 2023-05-24 ASSESSMENT — PAIN - FUNCTIONAL ASSESSMENT: PAIN_FUNCTIONAL_ASSESSMENT: 0-10

## 2023-05-24 ASSESSMENT — PAIN DESCRIPTION - LOCATION: LOCATION: LEG

## 2023-05-24 ASSESSMENT — PAIN SCALES - GENERAL: PAINLEVEL_OUTOF10: 4

## 2023-05-24 NOTE — DISCHARGE INSTRUCTIONS
UA not consistent with infection. Recommend repeat UA if symptoms persist. Urine culture sent and pending. Return the emergency department if you have worsening pain, vomiting, unable to keep down food or drink, fever, black or bloody stool, or any other new or concerning symptoms. Follow up with your primary care physician at the next available appointment.

## 2023-05-24 NOTE — ED NOTES
Pt refusing external catheter. Per family pt is continent. Pt and family educated on the need for urine specimen. Will follow up.        Alisha Dan RN  05/24/23 8072

## 2023-05-24 NOTE — ED PROVIDER NOTES
Result Value    Lymphocytes Absolute 1.10 (*)     All other components within normal limits   COMPREHENSIVE METABOLIC PANEL - Abnormal; Notable for the following components:    Chloride 96 (*)     Glucose 119 (*)     All other components within normal limits   URINALYSIS WITH MICROSCOPIC - Abnormal; Notable for the following components:    Leukocyte Esterase, Urine TRACE (*)     All other components within normal limits   CULTURE, URINE       RADIOLOGY:  Interpreted personally and by Radiologist.  CT ABDOMEN PELVIS W IV CONTRAST Additional Contrast? None   Final Result   Distended gallbladder but no evidence of significant gallbladder wall   thickening or pericholecystic stranding. Abundance of stool visualized in the large bowel. Duodenal diverticulum. Diverticular disease but no evidence of acute diverticulitis. Extensive degenerative bone changes visualized. Moderate sized hiatus hernia. XR CHEST PORTABLE   Final Result   Suspect early pulmonary vascular congestion, slightly improved since prior   study. Large hiatal hernia.             ------------------------- NURSING NOTES AND VITALS REVIEWED ---------------------------   The nursing notes within the ED encounter and vital signs as below have been reviewed by myself. BP (!) 173/101   Pulse (!) 104   Resp 22   Wt 105 lb (47.6 kg) Comment: per daughter  SpO2 94%   BMI 22.72 kg/m²   Oxygen Saturation Interpretation: Normal    The patients available past medical records and past encounters were reviewed.         ------------------------------ ED COURSE/MEDICAL DECISION MAKING----------------------  Medications   pantoprazole (PROTONIX) injection 40 mg (40 mg IntraVENous Not Given 5/24/23 1309)   0.9 % sodium chloride bolus (1,000 mLs IntraVENous New Bag 5/24/23 1029)   iopamidol (ISOVUE-370) 76 % injection 75 mL (75 mLs IntraVENous Given 5/24/23 1444)         ED COURSE:  ED Course as of 05/24/23 6392   Wed May 24,

## 2023-05-26 LAB — BACTERIA UR CULT: NORMAL

## 2023-11-12 ENCOUNTER — APPOINTMENT (OUTPATIENT)
Dept: CT IMAGING | Age: 88
DRG: 689 | End: 2023-11-12
Attending: EMERGENCY MEDICINE
Payer: MEDICARE

## 2023-11-12 ENCOUNTER — HOSPITAL ENCOUNTER (INPATIENT)
Age: 88
LOS: 9 days | Discharge: HOSPICE | DRG: 689 | End: 2023-11-21
Attending: EMERGENCY MEDICINE | Admitting: INTERNAL MEDICINE
Payer: MEDICARE

## 2023-11-12 ENCOUNTER — APPOINTMENT (OUTPATIENT)
Dept: GENERAL RADIOLOGY | Age: 88
DRG: 689 | End: 2023-11-12
Payer: MEDICARE

## 2023-11-12 DIAGNOSIS — A49.9 UTI (URINARY TRACT INFECTION), BACTERIAL: ICD-10-CM

## 2023-11-12 DIAGNOSIS — N39.0 UTI (URINARY TRACT INFECTION), BACTERIAL: ICD-10-CM

## 2023-11-12 DIAGNOSIS — R41.82 ALTERED MENTAL STATUS, UNSPECIFIED ALTERED MENTAL STATUS TYPE: Primary | ICD-10-CM

## 2023-11-12 LAB
ALBUMIN SERPL-MCNC: 4.9 G/DL (ref 3.5–5.2)
ALP SERPL-CCNC: 100 U/L (ref 35–104)
ALT SERPL-CCNC: 10 U/L (ref 0–32)
ANION GAP SERPL CALCULATED.3IONS-SCNC: 15 MMOL/L (ref 7–16)
AST SERPL-CCNC: 29 U/L (ref 0–31)
BACTERIA URNS QL MICRO: ABNORMAL
BASOPHILS # BLD: 0.06 K/UL (ref 0–0.2)
BASOPHILS NFR BLD: 1 % (ref 0–2)
BILIRUB SERPL-MCNC: 0.4 MG/DL (ref 0–1.2)
BILIRUB UR QL STRIP: NEGATIVE
BUN SERPL-MCNC: 10 MG/DL (ref 6–23)
CALCIUM SERPL-MCNC: 10.1 MG/DL (ref 8.6–10.2)
CHLORIDE SERPL-SCNC: 92 MMOL/L (ref 98–107)
CLARITY UR: CLEAR
CO2 SERPL-SCNC: 24 MMOL/L (ref 22–29)
COLOR UR: YELLOW
CREAT SERPL-MCNC: 0.9 MG/DL (ref 0.5–1)
EOSINOPHIL # BLD: 0.03 K/UL (ref 0.05–0.5)
EOSINOPHILS RELATIVE PERCENT: 0 % (ref 0–6)
EPI CELLS #/AREA URNS HPF: ABNORMAL /HPF
ERYTHROCYTE [DISTWIDTH] IN BLOOD BY AUTOMATED COUNT: 14.3 % (ref 11.5–15)
GFR SERPL CREATININE-BSD FRML MDRD: 59 ML/MIN/1.73M2
GLUCOSE SERPL-MCNC: 130 MG/DL (ref 74–99)
GLUCOSE UR STRIP-MCNC: NEGATIVE MG/DL
HCT VFR BLD AUTO: 45.4 % (ref 34–48)
HGB BLD-MCNC: 14.8 G/DL (ref 11.5–15.5)
HGB UR QL STRIP.AUTO: ABNORMAL
IMM GRANULOCYTES # BLD AUTO: <0.03 K/UL (ref 0–0.58)
IMM GRANULOCYTES NFR BLD: 0 % (ref 0–5)
KETONES UR STRIP-MCNC: NEGATIVE MG/DL
LEUKOCYTE ESTERASE UR QL STRIP: ABNORMAL
LYMPHOCYTES NFR BLD: 0.78 K/UL (ref 1.5–4)
LYMPHOCYTES RELATIVE PERCENT: 10 % (ref 20–42)
MCH RBC QN AUTO: 29.1 PG (ref 26–35)
MCHC RBC AUTO-ENTMCNC: 32.6 G/DL (ref 32–34.5)
MCV RBC AUTO: 89.2 FL (ref 80–99.9)
MONOCYTES NFR BLD: 0.75 K/UL (ref 0.1–0.95)
MONOCYTES NFR BLD: 9 % (ref 2–12)
NEUTROPHILS NFR BLD: 79 % (ref 43–80)
NEUTS SEG NFR BLD: 6.31 K/UL (ref 1.8–7.3)
NITRITE UR QL STRIP: NEGATIVE
PH UR STRIP: 7.5 [PH] (ref 5–9)
PLATELET # BLD AUTO: 301 K/UL (ref 130–450)
PMV BLD AUTO: 9.5 FL (ref 7–12)
POTASSIUM SERPL-SCNC: 4 MMOL/L (ref 3.5–5)
PROT SERPL-MCNC: 8.6 G/DL (ref 6.4–8.3)
PROT UR STRIP-MCNC: NEGATIVE MG/DL
RBC # BLD AUTO: 5.09 M/UL (ref 3.5–5.5)
RBC #/AREA URNS HPF: ABNORMAL /HPF
SODIUM SERPL-SCNC: 131 MMOL/L (ref 132–146)
SP GR UR STRIP: 1.01 (ref 1–1.03)
TROPONIN I SERPL HS-MCNC: 24 NG/L (ref 0–9)
TROPONIN I SERPL HS-MCNC: 28 NG/L (ref 0–9)
UROBILINOGEN UR STRIP-ACNC: 0.2 EU/DL (ref 0–1)
WBC #/AREA URNS HPF: ABNORMAL /HPF
WBC OTHER # BLD: 8 K/UL (ref 4.5–11.5)

## 2023-11-12 PROCEDURE — 93005 ELECTROCARDIOGRAM TRACING: CPT | Performed by: NURSE PRACTITIONER

## 2023-11-12 PROCEDURE — 81001 URINALYSIS AUTO W/SCOPE: CPT

## 2023-11-12 PROCEDURE — 74176 CT ABD & PELVIS W/O CONTRAST: CPT

## 2023-11-12 PROCEDURE — 71045 X-RAY EXAM CHEST 1 VIEW: CPT

## 2023-11-12 PROCEDURE — 2060000000 HC ICU INTERMEDIATE R&B

## 2023-11-12 PROCEDURE — 96374 THER/PROPH/DIAG INJ IV PUSH: CPT

## 2023-11-12 PROCEDURE — 6360000002 HC RX W HCPCS: Performed by: EMERGENCY MEDICINE

## 2023-11-12 PROCEDURE — 87086 URINE CULTURE/COLONY COUNT: CPT

## 2023-11-12 PROCEDURE — 6370000000 HC RX 637 (ALT 250 FOR IP): Performed by: INTERNAL MEDICINE

## 2023-11-12 PROCEDURE — 80053 COMPREHEN METABOLIC PANEL: CPT

## 2023-11-12 PROCEDURE — 2580000003 HC RX 258: Performed by: INTERNAL MEDICINE

## 2023-11-12 PROCEDURE — 6360000002 HC RX W HCPCS

## 2023-11-12 PROCEDURE — 85025 COMPLETE CBC W/AUTO DIFF WBC: CPT

## 2023-11-12 PROCEDURE — 84484 ASSAY OF TROPONIN QUANT: CPT

## 2023-11-12 PROCEDURE — 99285 EMERGENCY DEPT VISIT HI MDM: CPT

## 2023-11-12 PROCEDURE — 2580000003 HC RX 258: Performed by: EMERGENCY MEDICINE

## 2023-11-12 RX ORDER — BISACODYL 5 MG/1
10 TABLET, DELAYED RELEASE ORAL ONCE
Status: COMPLETED | OUTPATIENT
Start: 2023-11-12 | End: 2023-11-12

## 2023-11-12 RX ORDER — ONDANSETRON 2 MG/ML
4 INJECTION INTRAMUSCULAR; INTRAVENOUS EVERY 6 HOURS PRN
Status: DISCONTINUED | OUTPATIENT
Start: 2023-11-12 | End: 2023-11-21 | Stop reason: HOSPADM

## 2023-11-12 RX ORDER — TRIAMCINOLONE ACETONIDE 1 MG/G
CREAM TOPICAL 2 TIMES DAILY
Status: ON HOLD | COMMUNITY
End: 2023-11-15 | Stop reason: HOSPADM

## 2023-11-12 RX ORDER — POLYETHYLENE GLYCOL 3350 17 G/17G
17 POWDER, FOR SOLUTION ORAL DAILY
COMMUNITY

## 2023-11-12 RX ORDER — HEPARIN SODIUM 10000 [USP'U]/ML
5000 INJECTION, SOLUTION INTRAVENOUS; SUBCUTANEOUS 2 TIMES DAILY
Status: DISCONTINUED | OUTPATIENT
Start: 2023-11-12 | End: 2023-11-21 | Stop reason: HOSPADM

## 2023-11-12 RX ORDER — SODIUM CHLORIDE 0.9 % (FLUSH) 0.9 %
5-40 SYRINGE (ML) INJECTION PRN
Status: DISCONTINUED | OUTPATIENT
Start: 2023-11-12 | End: 2023-11-21 | Stop reason: HOSPADM

## 2023-11-12 RX ORDER — AMLODIPINE BESYLATE 5 MG/1
5 TABLET ORAL DAILY
Status: DISCONTINUED | OUTPATIENT
Start: 2023-11-12 | End: 2023-11-13

## 2023-11-12 RX ORDER — ONDANSETRON 4 MG/1
4 TABLET, ORALLY DISINTEGRATING ORAL EVERY 8 HOURS PRN
Status: DISCONTINUED | OUTPATIENT
Start: 2023-11-12 | End: 2023-11-21 | Stop reason: HOSPADM

## 2023-11-12 RX ORDER — HALOPERIDOL 5 MG/ML
INJECTION INTRAMUSCULAR
Status: COMPLETED
Start: 2023-11-12 | End: 2023-11-12

## 2023-11-12 RX ORDER — POLYETHYLENE GLYCOL 3350 17 G/17G
17 POWDER, FOR SOLUTION ORAL DAILY PRN
Status: DISCONTINUED | OUTPATIENT
Start: 2023-11-12 | End: 2023-11-21 | Stop reason: HOSPADM

## 2023-11-12 RX ORDER — NYSTATIN 100000 [USP'U]/G
POWDER TOPICAL 2 TIMES DAILY
Status: ON HOLD | COMMUNITY
End: 2023-11-15 | Stop reason: HOSPADM

## 2023-11-12 RX ORDER — SODIUM CHLORIDE 0.9 % (FLUSH) 0.9 %
5-40 SYRINGE (ML) INJECTION EVERY 12 HOURS SCHEDULED
Status: DISCONTINUED | OUTPATIENT
Start: 2023-11-12 | End: 2023-11-21 | Stop reason: HOSPADM

## 2023-11-12 RX ORDER — HALOPERIDOL 5 MG/ML
2 INJECTION INTRAMUSCULAR ONCE
Status: COMPLETED | OUTPATIENT
Start: 2023-11-12 | End: 2023-11-12

## 2023-11-12 RX ORDER — ASPIRIN 81 MG/1
81 TABLET ORAL DAILY
Status: DISCONTINUED | OUTPATIENT
Start: 2023-11-12 | End: 2023-11-21 | Stop reason: HOSPADM

## 2023-11-12 RX ORDER — SODIUM CHLORIDE 9 MG/ML
INJECTION, SOLUTION INTRAVENOUS PRN
Status: DISCONTINUED | OUTPATIENT
Start: 2023-11-12 | End: 2023-11-21 | Stop reason: HOSPADM

## 2023-11-12 RX ORDER — PANTOPRAZOLE SODIUM 40 MG/1
40 TABLET, DELAYED RELEASE ORAL
Status: DISCONTINUED | OUTPATIENT
Start: 2023-11-13 | End: 2023-11-21 | Stop reason: HOSPADM

## 2023-11-12 RX ORDER — ACETAMINOPHEN 650 MG/1
650 SUPPOSITORY RECTAL EVERY 6 HOURS PRN
Status: DISCONTINUED | OUTPATIENT
Start: 2023-11-12 | End: 2023-11-21 | Stop reason: HOSPADM

## 2023-11-12 RX ORDER — HALOPERIDOL 5 MG/ML
2 INJECTION INTRAMUSCULAR EVERY 6 HOURS PRN
Status: DISCONTINUED | OUTPATIENT
Start: 2023-11-12 | End: 2023-11-18

## 2023-11-12 RX ORDER — DROPERIDOL 2.5 MG/ML
1.25 INJECTION, SOLUTION INTRAMUSCULAR; INTRAVENOUS ONCE
Status: COMPLETED | OUTPATIENT
Start: 2023-11-12 | End: 2023-11-12

## 2023-11-12 RX ORDER — ACETAMINOPHEN 325 MG/1
650 TABLET ORAL EVERY 6 HOURS PRN
Status: DISCONTINUED | OUTPATIENT
Start: 2023-11-12 | End: 2023-11-21 | Stop reason: HOSPADM

## 2023-11-12 RX ORDER — DOCUSATE SODIUM 100 MG/1
100 CAPSULE, LIQUID FILLED ORAL 2 TIMES DAILY
Status: DISCONTINUED | OUTPATIENT
Start: 2023-11-12 | End: 2023-11-21 | Stop reason: HOSPADM

## 2023-11-12 RX ORDER — DIPHENHYDRAMINE HYDROCHLORIDE 50 MG/ML
25 INJECTION INTRAMUSCULAR; INTRAVENOUS ONCE
Status: COMPLETED | OUTPATIENT
Start: 2023-11-12 | End: 2023-11-12

## 2023-11-12 RX ADMIN — DOCUSATE SODIUM 100 MG: 100 CAPSULE, LIQUID FILLED ORAL at 12:46

## 2023-11-12 RX ADMIN — SODIUM CHLORIDE, PRESERVATIVE FREE 10 ML: 5 INJECTION INTRAVENOUS at 20:45

## 2023-11-12 RX ADMIN — HALOPERIDOL LACTATE 2 MG: 5 INJECTION, SOLUTION INTRAMUSCULAR at 10:03

## 2023-11-12 RX ADMIN — HALOPERIDOL 2 MG: 5 INJECTION INTRAMUSCULAR at 10:03

## 2023-11-12 RX ADMIN — BISACODYL 10 MG: 5 TABLET, COATED ORAL at 12:46

## 2023-11-12 RX ADMIN — DIPHENHYDRAMINE HYDROCHLORIDE 25 MG: 50 INJECTION INTRAMUSCULAR; INTRAVENOUS at 08:20

## 2023-11-12 RX ADMIN — WATER 1000 MG: 1 INJECTION INTRAMUSCULAR; INTRAVENOUS; SUBCUTANEOUS at 07:25

## 2023-11-12 RX ADMIN — DROPERIDOL 1.25 MG: 2.5 INJECTION, SOLUTION INTRAMUSCULAR; INTRAVENOUS at 08:35

## 2023-11-12 RX ADMIN — AMLODIPINE BESYLATE 5 MG: 5 TABLET ORAL at 12:46

## 2023-11-12 ASSESSMENT — PAIN - FUNCTIONAL ASSESSMENT: PAIN_FUNCTIONAL_ASSESSMENT: NONE - DENIES PAIN

## 2023-11-12 NOTE — H&P
[address], [address] [place] [address]                              HISTORY AND PHYSICAL    PATIENT NAME: Kwasi Hu                      :        1929  MED REC NO:   [id number]                            ROOM:       13  ACCOUNT NO:   [de-identified]                           ADMIT DATE: 2023  PROVIDER:     Jayleen Lu DO    CHIEF COMPLAINT:  Altered mental status. HISTORY OF PRESENT ILLNESS:  The patient is a 70-year-old   female who presented from home with altered mental status. Diagnostic  evaluation in the emergency room revealed findings consistent with  possible UTI. The patient's daughter who is at the bedside refuses CAT  scan of the head. The patient was admitted for further evaluation and  treatment. PAST MEDICAL HISTORY:  Chronic back pain, hyperlipidemia, hypertension,  osteoarthritis, osteoporosis, TIA. MEDICATIONS PRIOR TO ADMISSION:  Protonix p.r.n., Tylenol p.r.n.,  aspirin. PAST SURGICAL HISTORY:  Bilateral eye cataract surgery, left total knee  replacement. SOCIAL HISTORY:  The patient quit tobacco.  No alcohol. REVIEW OF SYSTEMS:  Remarkable for above-stated chief complaint. ALLERGIES:  PENICILLIN and VERSED. PRIMARY CARE PROVIDER:  Dr. Ventura Leung. PHYSICAL EXAMINATION:  GENERAL:  Reveals a 70-year-old  female who is alert,  cooperative and a poor historian. All of history obtained from  patient's daughter who is at the bedside. HEENT:  Normocephalic, atraumatic. Eyes:  Pupils equal and reactive to  light. Extraocular muscles intact. Fundi not well visualized. Nose:   No obstruction, polyp or discharge. Mouth:  Mucosa without lesion. Pharynx:  Noninjected without exudate. NECK:  Supple. No JVD, no thyromegaly, no carotid bruits. HEART:  Regular rate and rhythm without murmur. LUNGS:  Clear to auscultation bilaterally.   ABDOMEN:  Positive

## 2023-11-13 LAB
EKG ATRIAL RATE: 81 BPM
EKG P AXIS: 19 DEGREES
EKG P-R INTERVAL: 166 MS
EKG Q-T INTERVAL: 384 MS
EKG QRS DURATION: 78 MS
EKG QTC CALCULATION (BAZETT): 446 MS
EKG R AXIS: -9 DEGREES
EKG T AXIS: 22 DEGREES
EKG VENTRICULAR RATE: 81 BPM
MICROORGANISM SPEC CULT: ABNORMAL
MICROORGANISM SPEC CULT: ABNORMAL
SPECIMEN DESCRIPTION: ABNORMAL

## 2023-11-13 PROCEDURE — 97165 OT EVAL LOW COMPLEX 30 MIN: CPT

## 2023-11-13 PROCEDURE — 2060000000 HC ICU INTERMEDIATE R&B

## 2023-11-13 PROCEDURE — 97535 SELF CARE MNGMENT TRAINING: CPT

## 2023-11-13 PROCEDURE — 2580000003 HC RX 258: Performed by: INTERNAL MEDICINE

## 2023-11-13 PROCEDURE — 6360000002 HC RX W HCPCS: Performed by: INTERNAL MEDICINE

## 2023-11-13 PROCEDURE — 97530 THERAPEUTIC ACTIVITIES: CPT

## 2023-11-13 PROCEDURE — 6370000000 HC RX 637 (ALT 250 FOR IP): Performed by: INTERNAL MEDICINE

## 2023-11-13 RX ORDER — SODIUM CHLORIDE 9 MG/ML
INJECTION, SOLUTION INTRAVENOUS CONTINUOUS
Status: DISCONTINUED | OUTPATIENT
Start: 2023-11-13 | End: 2023-11-15

## 2023-11-13 RX ORDER — AMLODIPINE BESYLATE 5 MG/1
2.5 TABLET ORAL DAILY
Status: DISCONTINUED | OUTPATIENT
Start: 2023-11-13 | End: 2023-11-21 | Stop reason: HOSPADM

## 2023-11-13 RX ADMIN — SODIUM CHLORIDE: 9 INJECTION, SOLUTION INTRAVENOUS at 17:58

## 2023-11-13 RX ADMIN — WATER 1000 MG: 1 INJECTION INTRAMUSCULAR; INTRAVENOUS; SUBCUTANEOUS at 09:22

## 2023-11-13 RX ADMIN — PANTOPRAZOLE SODIUM 40 MG: 40 TABLET, DELAYED RELEASE ORAL at 05:45

## 2023-11-13 RX ADMIN — SODIUM CHLORIDE, PRESERVATIVE FREE 10 ML: 5 INJECTION INTRAVENOUS at 09:23

## 2023-11-13 RX ADMIN — HEPARIN SODIUM 5000 UNITS: 10000 INJECTION INTRAVENOUS; SUBCUTANEOUS at 20:35

## 2023-11-13 RX ADMIN — DOCUSATE SODIUM 100 MG: 100 CAPSULE, LIQUID FILLED ORAL at 09:23

## 2023-11-13 RX ADMIN — AMLODIPINE BESYLATE 2.5 MG: 5 TABLET ORAL at 09:23

## 2023-11-13 RX ADMIN — ASPIRIN 81 MG: 81 TABLET, COATED ORAL at 09:22

## 2023-11-13 RX ADMIN — HEPARIN SODIUM 5000 UNITS: 10000 INJECTION INTRAVENOUS; SUBCUTANEOUS at 09:23

## 2023-11-13 RX ADMIN — POLYETHYLENE GLYCOL 3350 17 G: 17 POWDER, FOR SOLUTION ORAL at 16:12

## 2023-11-13 NOTE — ACP (ADVANCE CARE PLANNING)
Advance Care Planning   Healthcare Decision Maker:    Primary Decision Maker: Ramona Zacarias Paul Child - [phone]    Click here to complete Healthcare Decision Makers including selection of the Healthcare Decision Maker Relationship (ie \"Primary\").

## 2023-11-14 LAB
ANION GAP SERPL CALCULATED.3IONS-SCNC: 16 MMOL/L (ref 7–16)
BUN SERPL-MCNC: 20 MG/DL (ref 6–23)
CALCIUM SERPL-MCNC: 9.2 MG/DL (ref 8.6–10.2)
CHLORIDE SERPL-SCNC: 99 MMOL/L (ref 98–107)
CO2 SERPL-SCNC: 17 MMOL/L (ref 22–29)
CREAT SERPL-MCNC: 0.8 MG/DL (ref 0.5–1)
GFR SERPL CREATININE-BSD FRML MDRD: >60 ML/MIN/1.73M2
GLUCOSE SERPL-MCNC: 100 MG/DL (ref 74–99)
POTASSIUM SERPL-SCNC: 4 MMOL/L (ref 3.5–5)
SODIUM SERPL-SCNC: 132 MMOL/L (ref 132–146)

## 2023-11-14 PROCEDURE — 99222 1ST HOSP IP/OBS MODERATE 55: CPT

## 2023-11-14 PROCEDURE — 2060000000 HC ICU INTERMEDIATE R&B

## 2023-11-14 PROCEDURE — 92526 ORAL FUNCTION THERAPY: CPT

## 2023-11-14 PROCEDURE — 97530 THERAPEUTIC ACTIVITIES: CPT

## 2023-11-14 PROCEDURE — 80048 BASIC METABOLIC PNL TOTAL CA: CPT

## 2023-11-14 PROCEDURE — 36415 COLL VENOUS BLD VENIPUNCTURE: CPT

## 2023-11-14 PROCEDURE — 97162 PT EVAL MOD COMPLEX 30 MIN: CPT

## 2023-11-14 PROCEDURE — 92610 EVALUATE SWALLOWING FUNCTION: CPT

## 2023-11-14 PROCEDURE — 2580000003 HC RX 258: Performed by: INTERNAL MEDICINE

## 2023-11-14 PROCEDURE — 6370000000 HC RX 637 (ALT 250 FOR IP): Performed by: INTERNAL MEDICINE

## 2023-11-14 PROCEDURE — 6360000002 HC RX W HCPCS: Performed by: INTERNAL MEDICINE

## 2023-11-14 RX ADMIN — HEPARIN SODIUM 5000 UNITS: 10000 INJECTION INTRAVENOUS; SUBCUTANEOUS at 20:59

## 2023-11-14 RX ADMIN — DOCUSATE SODIUM 100 MG: 100 CAPSULE, LIQUID FILLED ORAL at 20:59

## 2023-11-14 RX ADMIN — HEPARIN SODIUM 5000 UNITS: 10000 INJECTION INTRAVENOUS; SUBCUTANEOUS at 07:45

## 2023-11-14 RX ADMIN — WATER 1000 MG: 1 INJECTION INTRAMUSCULAR; INTRAVENOUS; SUBCUTANEOUS at 07:44

## 2023-11-14 NOTE — CONSULTS
Palliative Care Department  [phone]  Palliative Care Initial Consult  Provider LAUREEN Bustillos - CNP      PATIENT: Lizy Barrow  : 1929  MRN: [id number]  ADMISSION DATE: 2023  1:46 AM  Referring Provider: Kalee Olivares DO    Palliative Medicine was consulted on hospital day 2 for assistance with Goals of care. HPI:     Oneida Palm is a 80 y.o. y/o female with a history of chronic back pain, hyperlipidemia, hypertension, osteoarthritis, osteoporosis, TIA who presented to [facility] on 2023 with altered mental status, admitted for further medical management of UTI. ASSESSMENT/PLAN:     Pertinent Hospital Diagnoses     UTI      Palliative Care Encounter / Counseling Regarding Goals of Care  Please see detailed goals of care discussion as below  At this time, Lizy Barrow, Does Not have capacity for medical decision-making. Capacity is time limited and situation/question specific  During encounter Eddi Iglesia  was surrogate medical decision-maker  Outcome of goals of care meeting:  Continue with current medical treatment  Daughter did not want to discuss goal of care today, however wants to discuss it tomorrow morning at the bedside at 9 AM  Code status DNR-CC  Advanced Directives: no POA or living will in [facility]  Surrogate/Legal NOK:  Raffi Romero - Child - [phone]    Spiritual assessment: no spiritual distress identified  Bereavement and grief: to be determined  Referrals to: none today    Thank you for the opportunity to participate in the care of Lizy Barrow. LAUREEN Florez CNP  Palliative Medicine     SUBJECTIVE:     Details of Conversation:     Chart was reviewed. Saw patient at the bedside.  present. There were no family's member present. Patient was awake, but pleasantly confused, she was not able to participate in a meaningful conversation.   Spoke with patient daughter Eddi Parekh over the phone, she reports it

## 2023-11-15 PROCEDURE — 6370000000 HC RX 637 (ALT 250 FOR IP): Performed by: INTERNAL MEDICINE

## 2023-11-15 PROCEDURE — 99232 SBSQ HOSP IP/OBS MODERATE 35: CPT

## 2023-11-15 PROCEDURE — 6360000002 HC RX W HCPCS: Performed by: INTERNAL MEDICINE

## 2023-11-15 PROCEDURE — 1200000000 HC SEMI PRIVATE

## 2023-11-15 RX ORDER — CEFDINIR 300 MG/1
300 CAPSULE ORAL DAILY
Qty: 5 CAPSULE | Refills: 0 | Status: SHIPPED | OUTPATIENT
Start: 2023-11-15 | End: 2023-11-21 | Stop reason: HOSPADM

## 2023-11-15 RX ORDER — CEFDINIR 300 MG/1
300 CAPSULE ORAL DAILY
Status: ACTIVE | OUTPATIENT
Start: 2023-11-15 | End: 2023-11-20

## 2023-11-15 RX ORDER — AMLODIPINE BESYLATE 2.5 MG/1
2.5 TABLET ORAL DAILY
Qty: 30 TABLET | Refills: 0 | Status: SHIPPED | OUTPATIENT
Start: 2023-11-15

## 2023-11-15 RX ADMIN — DOCUSATE SODIUM 100 MG: 100 CAPSULE, LIQUID FILLED ORAL at 09:31

## 2023-11-15 RX ADMIN — DOCUSATE SODIUM 100 MG: 100 CAPSULE, LIQUID FILLED ORAL at 20:55

## 2023-11-15 RX ADMIN — HALOPERIDOL LACTATE 2 MG: 5 INJECTION, SOLUTION INTRAMUSCULAR at 15:27

## 2023-11-15 RX ADMIN — HEPARIN SODIUM 5000 UNITS: 10000 INJECTION INTRAVENOUS; SUBCUTANEOUS at 20:55

## 2023-11-15 RX ADMIN — AMLODIPINE BESYLATE 2.5 MG: 5 TABLET ORAL at 09:30

## 2023-11-15 RX ADMIN — ASPIRIN 81 MG: 81 TABLET, COATED ORAL at 09:31

## 2023-11-15 RX ADMIN — CEFDINIR 300 MG: 300 CAPSULE ORAL at 09:31

## 2023-11-15 ASSESSMENT — PAIN SCALES - PAIN ASSESSMENT IN ADVANCED DEMENTIA (PAINAD)
NEGVOCALIZATION: 0
CONSOLABILITY: 0
TOTALSCORE: 0
BODYLANGUAGE: 0
FACIALEXPRESSION: 0
BREATHING: 0

## 2023-11-15 ASSESSMENT — PAIN SCALES - GENERAL
PAINLEVEL_OUTOF10: 0
PAINLEVEL_OUTOF10: 0

## 2023-11-15 NOTE — CONSULTS
Palliative Care Department  [phone]  Palliative Care Progress Note  Provider LAUREEN Huizar - CNP      PATIENT: Lizy Barrow  : 1929  MRN: [id number]  ADMISSION DATE: 2023  1:46 AM  Referring Provider: Kalee Olivares DO    Palliative Medicine was consulted on hospital day 3 for assistance with Goals of care. HPI:     Oneida Palm is a 80 y.o. y/o female with a history of chronic back pain, hyperlipidemia, hypertension, osteoarthritis, osteoporosis, TIA who presented to [facility] on 2023 with altered mental status, admitted for further medical management of UTI. ASSESSMENT/PLAN:     Pertinent Hospital Diagnoses     UTI      Palliative Care Encounter / Counseling Regarding Goals of Care  Please see detailed goals of care discussion as below  At this time, Lizy Barrow, Does Not have capacity for medical decision-making. Capacity is time limited and situation/question specific  During encounter Eddi Parekh  was surrogate medical decision-maker  Outcome of goals of care meeting:  Continue with current medical treatment  Family concerned about current confusion, and home safety, are considering SNF with long-term goals returning home  CODE STATUS was discussed, established limited DNR CCA DNI  Code status Limited DNR CCA DNI  Advanced Directives: no POA or living will in [facility]  Surrogate/Legal NOK:  Raffi Romero - Child - [phone]    Spiritual assessment: no spiritual distress identified  Bereavement and grief: to be determined  Referrals to: none today    Thank you for the opportunity to participate in the care of Lizy Barrow. LAUREEN Florez CNP  Palliative Medicine     SUBJECTIVE:     Details of Conversation:     Chart was reviewed. Met with patient's daughter Eddi Parekh at the bedside. Introduced myself and the role of palliative medicine.   We discussed patient's goal of care, Eddi Parekh stated her mother does not have a complex medical

## 2023-11-16 PROCEDURE — 6360000002 HC RX W HCPCS: Performed by: INTERNAL MEDICINE

## 2023-11-16 PROCEDURE — 6370000000 HC RX 637 (ALT 250 FOR IP): Performed by: INTERNAL MEDICINE

## 2023-11-16 PROCEDURE — 1200000000 HC SEMI PRIVATE

## 2023-11-16 RX ADMIN — PANTOPRAZOLE SODIUM 40 MG: 40 TABLET, DELAYED RELEASE ORAL at 05:55

## 2023-11-16 RX ADMIN — CEFDINIR 300 MG: 300 CAPSULE ORAL at 12:24

## 2023-11-16 RX ADMIN — HEPARIN SODIUM 5000 UNITS: 10000 INJECTION INTRAVENOUS; SUBCUTANEOUS at 12:44

## 2023-11-16 RX ADMIN — POLYETHYLENE GLYCOL 3350 17 G: 17 POWDER, FOR SOLUTION ORAL at 12:25

## 2023-11-16 RX ADMIN — HEPARIN SODIUM 5000 UNITS: 10000 INJECTION INTRAVENOUS; SUBCUTANEOUS at 21:14

## 2023-11-16 RX ADMIN — ASPIRIN 81 MG: 81 TABLET, COATED ORAL at 12:24

## 2023-11-16 RX ADMIN — DOCUSATE SODIUM 100 MG: 100 CAPSULE, LIQUID FILLED ORAL at 12:24

## 2023-11-16 ASSESSMENT — PAIN SCALES - PAIN ASSESSMENT IN ADVANCED DEMENTIA (PAINAD)
CONSOLABILITY: 0
BODYLANGUAGE: 0
NEGVOCALIZATION: 0
CONSOLABILITY: 0
NEGVOCALIZATION: 0
TOTALSCORE: 0
FACIALEXPRESSION: 0
BREATHING: 0
BREATHING: 0
FACIALEXPRESSION: 0
BODYLANGUAGE: 0
TOTALSCORE: 0

## 2023-11-16 ASSESSMENT — PAIN SCALES - GENERAL: PAINLEVEL_OUTOF10: 0

## 2023-11-16 NOTE — PLAN OF CARE
Problem: Discharge Planning  Goal: Discharge to home or other facility with appropriate resources  Outcome: Progressing     Problem: Safety - Adult  Goal: Free from fall injury  Outcome: Progressing     Problem: Skin/Tissue Integrity  Goal: Absence of new skin breakdown  Outcome: Progressing     Problem: Pain  Goal: Verbalizes/displays adequate comfort level or baseline comfort level  Outcome: Progressing

## 2023-11-17 PROCEDURE — 1200000000 HC SEMI PRIVATE

## 2023-11-17 PROCEDURE — 6370000000 HC RX 637 (ALT 250 FOR IP): Performed by: INTERNAL MEDICINE

## 2023-11-17 PROCEDURE — 6360000002 HC RX W HCPCS: Performed by: INTERNAL MEDICINE

## 2023-11-17 PROCEDURE — 97535 SELF CARE MNGMENT TRAINING: CPT

## 2023-11-17 PROCEDURE — 97530 THERAPEUTIC ACTIVITIES: CPT

## 2023-11-17 RX ADMIN — HEPARIN SODIUM 5000 UNITS: 10000 INJECTION INTRAVENOUS; SUBCUTANEOUS at 08:57

## 2023-11-17 RX ADMIN — DOCUSATE SODIUM 100 MG: 100 CAPSULE, LIQUID FILLED ORAL at 21:04

## 2023-11-17 RX ADMIN — DOCUSATE SODIUM 100 MG: 100 CAPSULE, LIQUID FILLED ORAL at 08:56

## 2023-11-17 RX ADMIN — AMLODIPINE BESYLATE 2.5 MG: 5 TABLET ORAL at 08:56

## 2023-11-17 RX ADMIN — PANTOPRAZOLE SODIUM 40 MG: 40 TABLET, DELAYED RELEASE ORAL at 05:15

## 2023-11-17 RX ADMIN — CEFDINIR 300 MG: 300 CAPSULE ORAL at 08:56

## 2023-11-17 RX ADMIN — HEPARIN SODIUM 5000 UNITS: 10000 INJECTION INTRAVENOUS; SUBCUTANEOUS at 21:01

## 2023-11-17 RX ADMIN — ASPIRIN 81 MG: 81 TABLET, COATED ORAL at 08:56

## 2023-11-18 PROCEDURE — 6360000002 HC RX W HCPCS: Performed by: INTERNAL MEDICINE

## 2023-11-18 PROCEDURE — 6370000000 HC RX 637 (ALT 250 FOR IP): Performed by: INTERNAL MEDICINE

## 2023-11-18 PROCEDURE — 1200000000 HC SEMI PRIVATE

## 2023-11-18 RX ORDER — DIVALPROEX SODIUM 125 MG/1
125 CAPSULE, COATED PELLETS ORAL EVERY 12 HOURS SCHEDULED
Status: DISCONTINUED | OUTPATIENT
Start: 2023-11-18 | End: 2023-11-21 | Stop reason: HOSPADM

## 2023-11-18 RX ORDER — HALOPERIDOL 5 MG/ML
2 INJECTION INTRAMUSCULAR EVERY 6 HOURS PRN
Status: DISCONTINUED | OUTPATIENT
Start: 2023-11-18 | End: 2023-11-21 | Stop reason: HOSPADM

## 2023-11-18 RX ADMIN — DOCUSATE SODIUM 100 MG: 100 CAPSULE, LIQUID FILLED ORAL at 09:40

## 2023-11-18 RX ADMIN — PANTOPRAZOLE SODIUM 40 MG: 40 TABLET, DELAYED RELEASE ORAL at 05:50

## 2023-11-18 RX ADMIN — HEPARIN SODIUM 5000 UNITS: 10000 INJECTION INTRAVENOUS; SUBCUTANEOUS at 09:41

## 2023-11-18 RX ADMIN — CEFDINIR 300 MG: 300 CAPSULE ORAL at 09:41

## 2023-11-18 RX ADMIN — HALOPERIDOL LACTATE 2 MG: 5 INJECTION, SOLUTION INTRAMUSCULAR at 14:46

## 2023-11-18 RX ADMIN — DIVALPROEX SODIUM 125 MG: 125 CAPSULE, COATED PELLETS ORAL at 11:42

## 2023-11-18 RX ADMIN — AMLODIPINE BESYLATE 2.5 MG: 5 TABLET ORAL at 09:40

## 2023-11-18 RX ADMIN — ASPIRIN 81 MG: 81 TABLET, COATED ORAL at 09:40

## 2023-11-18 ASSESSMENT — PAIN SCALES - GENERAL
PAINLEVEL_OUTOF10: 0
PAINLEVEL_OUTOF10: 0

## 2023-11-18 ASSESSMENT — PAIN SCALES - WONG BAKER: WONGBAKER_NUMERICALRESPONSE: 0

## 2023-11-18 NOTE — PLAN OF CARE
Problem: Discharge Planning  Goal: Discharge to home or other facility with appropriate resources  11/17/2023 2155 by Rocio Lewis RN  Outcome: Progressing  11/17/2023 1557 by Luis Bautista RN  Outcome: Progressing     Problem: Safety - Adult  Goal: Free from fall injury  11/17/2023 2155 by Rocio Lewis RN  Outcome: Progressing  11/17/2023 1557 by Luis Bautista RN  Outcome: Progressing     Problem: Skin/Tissue Integrity  Goal: Absence of new skin breakdown  Description: 1. Monitor for areas of redness and/or skin breakdown  2. Assess vascular access sites hourly  3. Every 4-6 hours minimum:  Change oxygen saturation probe site  4. Every 4-6 hours:  If on nasal continuous positive airway pressure, respiratory therapy assess nares and determine need for appliance change or resting period.   11/17/2023 2155 by Rocio Lewis RN  Outcome: Progressing  11/17/2023 1557 by Luis Bautista RN  Outcome: Progressing     Problem: Pain  Goal: Verbalizes/displays adequate comfort level or baseline comfort level  11/17/2023 2155 by Rocio Lewis RN  Outcome: Progressing  11/17/2023 1557 by Luis Bautista RN  Outcome: Progressing

## 2023-11-18 NOTE — PLAN OF CARE
Problem: Discharge Planning  Goal: Discharge to home or other facility with appropriate resources  11/18/2023 1025 by Nadege Gutierrez RN  Outcome: Progressing     Problem: Safety - Adult  Goal: Free from fall injury  11/18/2023 1025 by Nadege Gutierrez RN  Outcome: Progressing     Problem: Skin/Tissue Integrity  Goal: Absence of new skin breakdown  Description: 1. Monitor for areas of redness and/or skin breakdown  2. Assess vascular access sites hourly  3. Every 4-6 hours minimum:  Change oxygen saturation probe site  4. Every 4-6 hours:  If on nasal continuous positive airway pressure, respiratory therapy assess nares and determine need for appliance change or resting period.   11/18/2023 1025 by Joe Lyn RN  Outcome: Progressing     Problem: Pain  Goal: Verbalizes/displays adequate comfort level or baseline comfort level  11/18/2023 1025 by Nadege Gutierrez RN  Outcome: Progressing

## 2023-11-19 PROCEDURE — 6370000000 HC RX 637 (ALT 250 FOR IP): Performed by: INTERNAL MEDICINE

## 2023-11-19 PROCEDURE — 6360000002 HC RX W HCPCS: Performed by: INTERNAL MEDICINE

## 2023-11-19 PROCEDURE — 1200000000 HC SEMI PRIVATE

## 2023-11-19 RX ADMIN — HEPARIN SODIUM 5000 UNITS: 10000 INJECTION INTRAVENOUS; SUBCUTANEOUS at 21:26

## 2023-11-19 RX ADMIN — HEPARIN SODIUM 5000 UNITS: 10000 INJECTION INTRAVENOUS; SUBCUTANEOUS at 11:18

## 2023-11-19 NOTE — PLAN OF CARE
Problem: Discharge Planning  Goal: Discharge to home or other facility with appropriate resources  Outcome: Progressing     Problem: Safety - Adult  Goal: Free from fall injury  Outcome: Progressing     Problem: Skin/Tissue Integrity  Goal: Absence of new skin breakdown  Description: 1. Monitor for areas of redness and/or skin breakdown  2. Assess vascular access sites hourly  3. Every 4-6 hours minimum:  Change oxygen saturation probe site  4. Every 4-6 hours:  If on nasal continuous positive airway pressure, respiratory therapy assess nares and determine need for appliance change or resting period. Outcome: Not Progressing     Problem: Pain  Goal: Verbalizes/displays adequate comfort level or baseline comfort level  Outcome: Adequate for Discharge     Problem: ABCDS Injury Assessment  Goal: Absence of physical injury  Outcome: Progressing     Problem: Chronic Conditions and Co-morbidities  Goal: Patient's chronic conditions and co-morbidity symptoms are monitored and maintained or improved  Outcome: Not Progressing     Problem: Neurosensory - Adult  Goal: Achieves stable or improved neurological status  Outcome: Progressing  Goal: Achieves maximal functionality and self care  Outcome: Not Progressing     Problem: Skin/Tissue Integrity - Adult  Goal: Skin integrity remains intact  Outcome: Not Progressing     Problem: Musculoskeletal - Adult  Goal: Return mobility to safest level of function  Outcome: Not Progressing  Goal: Return ADL status to a safe level of function  Outcome: Not Progressing     Problem: Sleep Disturbance  Goal: Will exhibit normal sleeping pattern  Description: INTERVENTIONS:  1. Administer medication as ordered  2. Decrease environmental stimuli, including noise, as appropriate  3. Discourage social isolation and naps during the day  Outcome: Progressing     Problem: Self Care Deficit  Goal: Return ADL status to a safe level of function  Description: INTERVENTIONS:  1.  Administer

## 2023-11-20 ENCOUNTER — TELEPHONE (OUTPATIENT)
Dept: HOSPICE | Age: 88
End: 2023-11-20

## 2023-11-20 DIAGNOSIS — Z51.5 HOSPICE CARE PATIENT: Primary | ICD-10-CM

## 2023-11-20 DIAGNOSIS — F41.9 ANXIETY: ICD-10-CM

## 2023-11-20 DIAGNOSIS — R09.89 AIR HUNGER: ICD-10-CM

## 2023-11-20 DIAGNOSIS — R52 PAIN: ICD-10-CM

## 2023-11-20 PROCEDURE — 6370000000 HC RX 637 (ALT 250 FOR IP): Performed by: INTERNAL MEDICINE

## 2023-11-20 PROCEDURE — 97530 THERAPEUTIC ACTIVITIES: CPT

## 2023-11-20 PROCEDURE — 99233 SBSQ HOSP IP/OBS HIGH 50: CPT

## 2023-11-20 PROCEDURE — 6360000002 HC RX W HCPCS: Performed by: INTERNAL MEDICINE

## 2023-11-20 PROCEDURE — 97535 SELF CARE MNGMENT TRAINING: CPT

## 2023-11-20 PROCEDURE — 1200000000 HC SEMI PRIVATE

## 2023-11-20 RX ORDER — HALOPERIDOL 1 MG/1
1 TABLET ORAL EVERY 6 HOURS PRN
Qty: 5 TABLET | Refills: 0 | Status: SHIPPED | OUTPATIENT
Start: 2023-11-20 | End: 2023-11-21 | Stop reason: HOSPADM

## 2023-11-20 RX ORDER — ACETAMINOPHEN 650 MG/1
650 SUPPOSITORY RECTAL EVERY 4 HOURS PRN
Qty: 3 SUPPOSITORY | Refills: 0 | Status: SHIPPED | OUTPATIENT
Start: 2023-11-20 | End: 2023-11-21 | Stop reason: HOSPADM

## 2023-11-20 RX ORDER — OLANZAPINE 5 MG/1
5 TABLET, ORALLY DISINTEGRATING ORAL EVERY 12 HOURS PRN
Qty: 5 TABLET | Refills: 0 | Status: SHIPPED | OUTPATIENT
Start: 2023-11-20 | End: 2023-11-21 | Stop reason: HOSPADM

## 2023-11-20 RX ORDER — MORPHINE SULFATE 100 MG/5ML
5 SOLUTION ORAL EVERY 6 HOURS PRN
Qty: 30 ML | Refills: 0 | Status: SHIPPED | OUTPATIENT
Start: 2023-11-20 | End: 2023-11-21 | Stop reason: HOSPADM

## 2023-11-20 RX ORDER — LORAZEPAM 1 MG/1
1 TABLET ORAL EVERY 6 HOURS PRN
Qty: 5 TABLET | Refills: 0 | Status: SHIPPED | OUTPATIENT
Start: 2023-11-20 | End: 2023-11-21 | Stop reason: HOSPADM

## 2023-11-20 RX ORDER — GLYCOPYRROLATE 2 MG/1
2 TABLET ORAL 3 TIMES DAILY PRN
Qty: 5 TABLET | Refills: 0 | Status: SHIPPED | OUTPATIENT
Start: 2023-11-20 | End: 2023-11-21 | Stop reason: HOSPADM

## 2023-11-20 RX ADMIN — DOCUSATE SODIUM 100 MG: 100 CAPSULE, LIQUID FILLED ORAL at 11:42

## 2023-11-20 RX ADMIN — AMLODIPINE BESYLATE 2.5 MG: 5 TABLET ORAL at 11:42

## 2023-11-20 RX ADMIN — DIVALPROEX SODIUM 125 MG: 125 CAPSULE, COATED PELLETS ORAL at 20:59

## 2023-11-20 RX ADMIN — HALOPERIDOL LACTATE 2 MG: 5 INJECTION, SOLUTION INTRAMUSCULAR at 23:54

## 2023-11-20 RX ADMIN — DOCUSATE SODIUM 100 MG: 100 CAPSULE, LIQUID FILLED ORAL at 20:59

## 2023-11-20 RX ADMIN — ASPIRIN 81 MG: 81 TABLET, COATED ORAL at 11:41

## 2023-11-20 RX ADMIN — DIVALPROEX SODIUM 125 MG: 125 CAPSULE, COATED PELLETS ORAL at 11:41

## 2023-11-20 RX ADMIN — HEPARIN SODIUM 5000 UNITS: 10000 INJECTION INTRAVENOUS; SUBCUTANEOUS at 20:59

## 2023-11-20 RX ADMIN — ACETAMINOPHEN 650 MG: 325 TABLET ORAL at 17:30

## 2023-11-20 NOTE — PLAN OF CARE
Problem: Discharge Planning  Goal: Discharge to home or other facility with appropriate resources  Outcome: Progressing     Problem: Safety - Adult  Goal: Free from fall injury  Outcome: Progressing     Problem: Skin/Tissue Integrity  Goal: Absence of new skin breakdown  Description: 1. Monitor for areas of redness and/or skin breakdown  2. Assess vascular access sites hourly  3. Every 4-6 hours minimum:  Change oxygen saturation probe site  4. Every 4-6 hours:  If on nasal continuous positive airway pressure, respiratory therapy assess nares and determine need for appliance change or resting period. Outcome: Not Progressing     Problem: Pain  Goal: Verbalizes/displays adequate comfort level or baseline comfort level  Outcome: Adequate for Discharge     Problem: ABCDS Injury Assessment  Goal: Absence of physical injury  Outcome: Progressing     Problem: Chronic Conditions and Co-morbidities  Goal: Patient's chronic conditions and co-morbidity symptoms are monitored and maintained or improved  Outcome: Not Progressing     Problem: Neurosensory - Adult  Goal: Achieves stable or improved neurological status  Outcome: Not Progressing  Goal: Achieves maximal functionality and self care  Outcome: Not Progressing     Problem: Skin/Tissue Integrity - Adult  Goal: Skin integrity remains intact  Outcome: Not Progressing     Problem: Musculoskeletal - Adult  Goal: Return mobility to safest level of function  Outcome: Not Progressing  Goal: Return ADL status to a safe level of function  Outcome: Not Progressing     Problem: Sleep Disturbance  Goal: Will exhibit normal sleeping pattern  Description: INTERVENTIONS:  1. Administer medication as ordered  2. Decrease environmental stimuli, including noise, as appropriate  3. Discourage social isolation and naps during the day  Outcome: Progressing     Problem: Self Care Deficit  Goal: Return ADL status to a safe level of function  Description: INTERVENTIONS:  1.  Administer

## 2023-11-20 NOTE — CONSULTS
Palliative Care Department  [phone]  Palliative Care Progress Note  Provider LAUREEN Cintron - CNP      PATIENT: Marquez Herrera  : 1929  MRN: [id number]  ADMISSION DATE: 2023  1:46 AM  Referring Provider: Harsh Bullard DO    Palliative Medicine was consulted on hospital day 8 for assistance with Goals of care. HPI:     Elijah Forbes is a 80 y.o. y/o female with a history of chronic back pain, hyperlipidemia, hypertension, osteoarthritis, osteoporosis, TIA who presented to [address] [place] [address] on 2023 with altered mental status, admitted for further medical management of UTI. ASSESSMENT/PLAN:     Pertinent Hospital Diagnoses     UTI      Palliative Care Encounter / Counseling Regarding Goals of Care  Please see detailed goals of care discussion as below  At this time, Marquez Herrera, Does Not have capacity for medical decision-making. Capacity is time limited and situation/question specific  During encounter Jeffery Fan  was surrogate medical decision-maker  Outcome of goals of care meeting:  Family now wants to take patient home with hospice  CODE STATUS discussed and changed to DNR CC    Code status DNR-CC  Advanced Directives: no POA or living will in [facility]  Surrogate/Legal NOK:  Gabby Jordan - Child - [phone]    Spiritual assessment: no spiritual distress identified  Bereavement and grief: to be determined  Referrals to: none today    Thank you for the opportunity to participate in the care of Marquez Herrera. LAUREEN Lane CNP  Palliative Medicine     SUBJECTIVE:     Details of Conversation:     Chart reviewed. Met with patient and daughter at the bedside. Patient was awake, but pleasantly confused. Per daughter, she has spoken with hospice, her goal is to take her mother home under hospice care. [facility] is following. CODE STATUS changed to DNR CC. We will continue to follow.       Prognosis: Guarded    OBJECTIVE:     BP (!)

## 2023-11-20 NOTE — TELEPHONE ENCOUNTER
Patient is getting admitted to hospice care. Hospice comfort care kit was sent to hospice pharmacy to place in the home setting.

## 2023-11-21 VITALS
RESPIRATION RATE: 16 BRPM | HEART RATE: 74 BPM | TEMPERATURE: 97 F | SYSTOLIC BLOOD PRESSURE: 116 MMHG | WEIGHT: 105 LBS | BODY MASS INDEX: 22.65 KG/M2 | DIASTOLIC BLOOD PRESSURE: 69 MMHG | OXYGEN SATURATION: 97 % | HEIGHT: 57 IN

## 2023-11-21 PROCEDURE — 6370000000 HC RX 637 (ALT 250 FOR IP): Performed by: INTERNAL MEDICINE

## 2023-11-21 PROCEDURE — 6360000002 HC RX W HCPCS: Performed by: INTERNAL MEDICINE

## 2023-11-21 RX ORDER — DIVALPROEX SODIUM 125 MG/1
125 CAPSULE, COATED PELLETS ORAL EVERY 12 HOURS SCHEDULED
Qty: 60 CAPSULE | Refills: 0 | Status: SHIPPED | OUTPATIENT
Start: 2023-11-21

## 2023-11-21 RX ADMIN — ASPIRIN 81 MG: 81 TABLET, COATED ORAL at 09:21

## 2023-11-21 RX ADMIN — AMLODIPINE BESYLATE 2.5 MG: 5 TABLET ORAL at 09:21

## 2023-11-21 RX ADMIN — DIVALPROEX SODIUM 125 MG: 125 CAPSULE, COATED PELLETS ORAL at 09:21

## 2023-11-21 RX ADMIN — HEPARIN SODIUM 5000 UNITS: 10000 INJECTION INTRAVENOUS; SUBCUTANEOUS at 09:22

## 2023-11-21 RX ADMIN — DOCUSATE SODIUM 100 MG: 100 CAPSULE, LIQUID FILLED ORAL at 09:22

## 2023-11-21 NOTE — CARE COORDINATION
11/21. The pt is returning home with hospice services. Physician's ambulance is scheduled to arrive at 1230.  Patti Sterling RN
Chart reviewed and case reviewed in IDR. Patient with a sitter at the bedside per the family's request.  Followed-up with Lyn Morillo, liaison with [facility] and confirmed patient is accepted and they will provide [facility] at discharge. Attempted to meet with the patient and family at the bedside. Patient's daughter has gone home for the day. Call placed to the patient's daughter Mary Daisy to discuss transition of care planning. Reviewed above and Mary Villa is in agreement with transition of care planning. Home care orders received and Alayna with [facility] notified. Will continue to follow.      Tasha Harden RN.  Alfonso Coughlin  [phone]
Patient remains hospitalized for AMS. She is alert to self and place  and calm today. PT [facility] 10/24. Metw ith daughter , Trina Guerrero at bedside to finalized discharge plan. She is not sure if she wants [facility] or home [facility]. She also wanted information only from [address] Declines list.Liasion. Left messages on VM for [facility] and [facility] liasions . Spoke with Agapito Matta from [facility]. Await acceptance . If daughter decides home , [facility] has accepted. Await daughter's decision. CM/SW will continue to follow  Ori Meneses from [facility] cannot accept at either facilities Per Agapito Matta they have no dementia beds at [facility] . After Hospice spoke with daughter, daughter now wants [address] . Linda from Hospice  will arange equipment and transportation for 12 noon tomorrow.  She will do Ambulance form CM/SW will continue to follow if needed
SOCIAL WORK/CASEMANAGEMENT TRANSITION OF CARE PLANNINGMaresther VASQUEZ, [facility]):  pallative care following. Plan is to [facility] per daughter, pt remains with a sitter. Must be sitter free for 24 hours min. To go to a [facility] and start precert once pt is accepted. Referral made to [employer], [employer], [facility] and [facility], [facility] is out of network. Will need updated PT and OT notes. LIGIA Andres  .11/16/2023    Telesitter ordered and waiting for one to be available. Pt has been sleeping all day. Haldol given yesterday around 3:30 p.m. pt to be transferred to [address] unit. Pt not able to work with therapy today. Will need updated therapy notes for precert and sars to look at when sitter free.  LIGIA Andres  .11/16/2023
SOCIAL WORK/CASEMANAGEMENT TRANSITION OF CARE PLANNINGSylvain Raji VASQUEZ, [facility]): I met with pt daughter, derek, this a.m. she said they cannot meet pt's needs at home at this time since she cannot ambulate and is very confused. Pt is with a sitter and I told derek that pt has to be sitter free and we will work on getting pt sitter free. Provided ste[facility]ie choices and they want in this order: [facility], [facility], [facility] and [facility]. Precert is needed with [facility] medicare. If pt improves with ambulation then daughter wants to take home with [facility]. I told the daughter that therapy will not see pt daily. LIGIA Snow.11/15/2023    Referrals made to [facility],  [facility], [facility] and [facility], reps to follow until sitter free. [facility] is out of network. LIGIA Snow  .11/15/2023          The Plan for Transition of Care is related to the following treatment goals: [facility]   The Patient and/or patient representative  was provided with a choice of provider and agrees   with the discharge plan. [x] Yes [] No  Freedom of choice list was provided with basic dialogue that supports the patient's individualized plan of care/goals, treatment preferences and shares the quality data associated with the providers.  [x] Yes [] No
[phone] Fax: [phone]      Notes:    Factors facilitating achievement of predicted outcomes: Family support, Cooperative, and Pleasant    Barriers to discharge: Confusion    Additional Case Management Notes: see above     The Plan for Transition of Care is related to the following treatment goals of Altered mental status [R41.82]  UTI (urinary tract infection), bacterial [N39.0, A49.9]  Altered mental status, unspecified altered mental status type [V45.89]    IF APPLICABLE: The Patient and/or patient representative Constancia Babinski and her family were provided with a choice of provider and agrees with the discharge plan. Freedom of choice list with basic dialogue that supports the patient's individualized plan of care/goals and shares the quality data associated with the providers was provided to:     Patient Representative Name:       The Patient and/or Patient Representative Agree with the Discharge Plan?       Yony Villanueva [place]  Case Management Department  Ph: [phone] Fax: [phone]

## 2023-11-22 DIAGNOSIS — R09.89 AIR HUNGER: ICD-10-CM

## 2023-11-22 DIAGNOSIS — R52 PAIN: ICD-10-CM

## 2023-11-22 DIAGNOSIS — Z51.5 HOSPICE CARE PATIENT: Primary | ICD-10-CM

## 2023-11-22 DIAGNOSIS — F41.9 ANXIETY: ICD-10-CM

## 2023-11-22 RX ORDER — ACETAMINOPHEN 650 MG/1
650 SUPPOSITORY RECTAL EVERY 4 HOURS PRN
Qty: 3 SUPPOSITORY | Refills: 0 | Status: SHIPPED | OUTPATIENT
Start: 2023-11-22

## 2023-11-22 RX ORDER — MORPHINE SULFATE 100 MG/5ML
5 SOLUTION ORAL EVERY 6 HOURS PRN
Qty: 30 ML | Refills: 0 | Status: SHIPPED | OUTPATIENT
Start: 2023-11-22 | End: 2023-12-22

## 2023-11-22 RX ORDER — OLANZAPINE 5 MG/1
5 TABLET, ORALLY DISINTEGRATING ORAL EVERY 12 HOURS PRN
Qty: 5 TABLET | Refills: 0 | Status: SHIPPED | OUTPATIENT
Start: 2023-11-22

## 2023-11-22 RX ORDER — HALOPERIDOL 1 MG/1
1 TABLET ORAL EVERY 6 HOURS PRN
Qty: 5 TABLET | Refills: 0 | Status: SHIPPED | OUTPATIENT
Start: 2023-11-22

## 2023-11-22 RX ORDER — GLYCOPYRROLATE 2 MG/1
2 TABLET ORAL 3 TIMES DAILY PRN
Qty: 5 TABLET | Refills: 0 | Status: SHIPPED | OUTPATIENT
Start: 2023-11-22

## 2023-11-22 RX ORDER — LORAZEPAM 1 MG/1
1 TABLET ORAL EVERY 6 HOURS PRN
Qty: 5 TABLET | Refills: 0 | Status: SHIPPED | OUTPATIENT
Start: 2023-11-22 | End: 2023-12-22

## 2023-11-22 NOTE — TELEPHONE ENCOUNTER
Patient admitted to hospice home services. Somehow initial comfort kit orders were discontinued. New orders for comfort kit for the home setting sent to hospice pharmacy today.

## 2023-12-04 DIAGNOSIS — G31.1 SENILE DEGENERATION OF BRAIN (HCC): Primary | ICD-10-CM

## 2023-12-04 DIAGNOSIS — F41.9 ANXIETY: ICD-10-CM

## 2023-12-04 DIAGNOSIS — Z51.5 HOSPICE CARE PATIENT: ICD-10-CM

## 2023-12-04 RX ORDER — LORAZEPAM 1 MG/1
1 TABLET ORAL EVERY 4 HOURS PRN
Qty: 60 TABLET | Refills: 5 | Status: SHIPPED | OUTPATIENT
Start: 2023-12-04 | End: 2024-03-03

## 2023-12-04 NOTE — TELEPHONE ENCOUNTER
Patient in need of refill of lorazepam on hospice; refill sent to pharmacy.     Fausto Mccain MD  12/4/2023

## 2024-01-03 DIAGNOSIS — R52 PAIN: ICD-10-CM

## 2024-01-03 DIAGNOSIS — R09.89 AIR HUNGER: ICD-10-CM

## 2024-01-03 DIAGNOSIS — Z51.5 HOSPICE CARE PATIENT: Primary | ICD-10-CM

## 2024-01-03 RX ORDER — MORPHINE SULFATE 100 MG/5ML
2.5 SOLUTION ORAL EVERY 4 HOURS PRN
Qty: 30 ML | Refills: 0 | Status: SHIPPED | OUTPATIENT
Start: 2024-01-03 | End: 2024-02-12

## 2024-01-08 DIAGNOSIS — R09.89 AIR HUNGER: ICD-10-CM

## 2024-01-08 DIAGNOSIS — G31.1 SENILE DEGENERATION OF BRAIN (HCC): Primary | ICD-10-CM

## 2024-01-08 DIAGNOSIS — Z51.5 HOSPICE CARE PATIENT: ICD-10-CM

## 2024-01-08 DIAGNOSIS — R52 PAIN: ICD-10-CM

## 2024-01-08 RX ORDER — MORPHINE SULFATE 100 MG/5ML
5 SOLUTION ORAL
Qty: 30 ML | Refills: 0 | Status: SHIPPED | OUTPATIENT
Start: 2024-01-08 | End: 2024-02-07

## 2024-01-08 NOTE — TELEPHONE ENCOUNTER
Patient in need of adjustment to morphine on hospice.  New prescription sent to hospice pharmacy.    Yousif Clayton MD  1/8/2024